# Patient Record
Sex: FEMALE | Race: OTHER | HISPANIC OR LATINO
[De-identification: names, ages, dates, MRNs, and addresses within clinical notes are randomized per-mention and may not be internally consistent; named-entity substitution may affect disease eponyms.]

---

## 2020-12-03 ENCOUNTER — RESULT REVIEW (OUTPATIENT)
Age: 30
End: 2020-12-03

## 2022-02-18 LAB — PAP SMEAR, EXTERNAL: NORMAL

## 2022-03-02 LAB
ANTIBODY SCREEN, EXTERNAL: NEGATIVE
HBSAG, EXTERNAL: NEGATIVE
HEPATITIS C AB,   EXT: NEGATIVE
HIV, EXTERNAL: NEGATIVE
HIV, EXTERNAL: NEGATIVE
RPR, EXTERNAL: NORMAL
RUBELLA, EXTERNAL: NORMAL
TYPE, ABO & RH, EXTERNAL: NORMAL
VARICELLA, EXTERNAL: 798
VARICELLA, EXTERNAL: 798

## 2022-03-22 LAB
HIV, EXTERNAL: NORMAL
VARICELLA, EXTERNAL: 798

## 2022-05-17 ENCOUNTER — NON-APPOINTMENT (OUTPATIENT)
Age: 32
End: 2022-05-17

## 2022-05-18 ENCOUNTER — TRANSCRIPTION ENCOUNTER (OUTPATIENT)
Age: 32
End: 2022-05-18

## 2022-05-21 ENCOUNTER — APPOINTMENT (OUTPATIENT)
Dept: DISASTER EMERGENCY | Facility: HOSPITAL | Age: 32
End: 2022-05-21

## 2022-05-21 ENCOUNTER — OUTPATIENT (OUTPATIENT)
Dept: OUTPATIENT SERVICES | Facility: HOSPITAL | Age: 32
LOS: 1 days | End: 2022-05-21

## 2022-05-21 VITALS
HEART RATE: 93 BPM | RESPIRATION RATE: 18 BRPM | SYSTOLIC BLOOD PRESSURE: 105 MMHG | OXYGEN SATURATION: 98 % | WEIGHT: 143.08 LBS | DIASTOLIC BLOOD PRESSURE: 69 MMHG | HEIGHT: 60 IN | TEMPERATURE: 99 F

## 2022-05-21 VITALS
RESPIRATION RATE: 17 BRPM | HEART RATE: 86 BPM | OXYGEN SATURATION: 98 % | DIASTOLIC BLOOD PRESSURE: 71 MMHG | TEMPERATURE: 98 F | SYSTOLIC BLOOD PRESSURE: 108 MMHG

## 2022-05-21 DIAGNOSIS — U07.1 COVID-19: ICD-10-CM

## 2022-05-21 RX ORDER — BEBTELOVIMAB 87.5 MG/ML
175 INJECTION, SOLUTION INTRAVENOUS ONCE
Refills: 0 | Status: COMPLETED | OUTPATIENT
Start: 2022-05-21 | End: 2022-05-21

## 2022-05-21 RX ADMIN — BEBTELOVIMAB 175 MILLIGRAM(S): 87.5 INJECTION, SOLUTION INTRAVENOUS at 11:30

## 2022-05-21 NOTE — CHART NOTE - NSCHARTNOTEFT_GEN_A_CORE
CC: Monoclonal Antibody Infusion/COVID 19 Positive  32y unvaccinated for COVID, Pregnant female, 22 weeks gestation and recent dx of COVID 19+ who presents today for elective Bebtelovimab. Patient has been screened and was deemed to be a candidate.    Symptoms/ Criteria  Date of Symptom Onset: 5/17/2022  Symptoms: nausea, body aches, fatigue, cough  Date of Positive COVID PCR: 5/19/2022  Risk Profile includes:  Pregnancy, unvaccinated status    Vaccination Status: Unvaccinated    PMHx:  Infection due to severe acute respiratory syndrome coronavirus 2 (SARS-CoV-2)  Unvaccinated COVID status        Exam/findings:  T(C): 36.9 (05-21-22 @ 12:30), Max: 37.1 (05-21-22 @ 11:17)  HR: 86 (05-21-22 @ 12:30) (86 - 94)  BP: 108/71 (05-21-22 @ 12:30) (100/66 - 108/71)  RR: 17 (05-21-22 @ 12:30) (17 - 18)  SpO2: 98% (05-21-22 @ 12:30) (97% - 98%)    PE:   Appearance: NAD	  HEENT:  NC/AT  Cardiovascular:  No edema  Respiratory: no use of accessory muscles  Gastrointestinal:  non-distended   Skin: warm and dry  Neurologic: Non-focal  Extremities: Normal range of motion    ASSESSMENT:  Pt is unvaccinated for COVID, Pregnant female, 22 weeks gestation COVID positive with mild to moderate symptoms who was referred for elective MAB (Bebtelovimab).    PLAN:  - MAB treatment explained to patient. I have reviewed the Bebtelovimab Emergency Use Authorization (EUA) and I have provided the patient or patient's caregiver with the following information:   1. FDA has authorized emergency use of Bebtelovimab to be administered for the treatment of mild to moderate COVID-19, which is not an FDA-approved biological product.   2. The patient or patient's caregiver has the option to accept or refuse administration of MAB.   3. The significant known and potential risks and benefits of Bebtelovimab and the extent to which such risks and benefits are unknown.  4. Information on available alternative treatments and risks and benefits of those alternatives.  - Patient verbalized understanding of plan and agrees to treatment. All questions answered.  - Consent for MAB obtained.   - 175mg of Bebtelovimab administered as a single intravenous injection over at least 30 seconds.   - Observe patient for one hour post medication administration and then if stable, discharge home with oupatient follow up as planned by PCP.      POST ASSESSMENT:   Patient completed MAB, and monitored x 1 hour post-infusion with no adverse reactions noted, remained hemodynamically stable.  - Patient tolerated infusion well; denies complaints of chest pain/SOB/dizziness/palpitations.   - VSS for discharge home.  - D/C instructions given/ fact sheet included.  - Patient was instructed to self-isolate and use infection control measures (e.g wear mask, isolate, social distance, avoid sharing personal items, clean and disinfect "high touch" surfaces, and frequent handwashing according to the CDC guidelines.   - The patient was informed on what symptoms to be aware of for the next couple of days, and if there are any issues to call the 24/7 clinical call center. Patient was instructed to follow up with primary care provider as needed.    DISCHARGE stable

## 2022-06-17 LAB
CHLAMYDIA, EXTERNAL: NEGATIVE
N. GONORRHEA, EXTERNAL: NEGATIVE

## 2022-07-17 ENCOUNTER — HOSPITAL ENCOUNTER (INPATIENT)
Age: 32
LOS: 2 days | Discharge: HOME | DRG: 566 | End: 2022-07-19
Attending: OBSTETRICS & GYNECOLOGY | Admitting: OBSTETRICS & GYNECOLOGY
Payer: MEDICAID

## 2022-07-17 PROBLEM — O47.03 FALSE LABOR BEFORE 37 COMPLETED WEEKS OF GESTATION IN THIRD TRIMESTER: Status: ACTIVE | Noted: 2022-07-17

## 2022-07-17 PROBLEM — Z3A.30 30 WEEKS GESTATION OF PREGNANCY: Status: ACTIVE | Noted: 2022-07-17

## 2022-07-17 PROBLEM — O60.03 PRETERM LABOR WITHOUT DELIVERY, THIRD TRIMESTER: Status: ACTIVE | Noted: 2022-07-17

## 2022-07-17 LAB
APPEARANCE UR: ABNORMAL
APPEARANCE UR: CLEAR
BACTERIA URNS QL MICRO: ABNORMAL /HPF
BACTERIA URNS QL MICRO: NEGATIVE /HPF
BASOPHILS # BLD: 0.1 K/UL (ref 0–0.1)
BASOPHILS NFR BLD: 0 % (ref 0–1)
BILIRUB UR QL: NEGATIVE
BILIRUB UR QL: NEGATIVE
COLOR UR: ABNORMAL
COLOR UR: ABNORMAL
DIFFERENTIAL METHOD BLD: ABNORMAL
EOSINOPHIL # BLD: 0.1 K/UL (ref 0–0.4)
EOSINOPHIL NFR BLD: 1 % (ref 0–7)
EPITH CASTS URNS QL MICRO: ABNORMAL /LPF
EPITH CASTS URNS QL MICRO: ABNORMAL /LPF
ERYTHROCYTE [DISTWIDTH] IN BLOOD BY AUTOMATED COUNT: 14 % (ref 11.5–14.5)
FIBRONECTIN FETAL VAG QL: NEGATIVE
GLUCOSE UR STRIP.AUTO-MCNC: NEGATIVE MG/DL
GLUCOSE UR STRIP.AUTO-MCNC: NEGATIVE MG/DL
HCT VFR BLD AUTO: 31.8 % (ref 35–47)
HGB BLD-MCNC: 10.1 G/DL (ref 11.5–16)
HGB UR QL STRIP: NEGATIVE
HGB UR QL STRIP: NEGATIVE
HYALINE CASTS URNS QL MICRO: ABNORMAL /LPF (ref 0–2)
IMM GRANULOCYTES # BLD AUTO: 0.2 K/UL (ref 0–0.04)
IMM GRANULOCYTES NFR BLD AUTO: 1 % (ref 0–0.5)
KETONES UR QL STRIP.AUTO: 40 MG/DL
KETONES UR QL STRIP.AUTO: 40 MG/DL
LEUKOCYTE ESTERASE UR QL STRIP.AUTO: ABNORMAL
LEUKOCYTE ESTERASE UR QL STRIP.AUTO: NEGATIVE
LYMPHOCYTES # BLD: 2.5 K/UL (ref 0.8–3.5)
LYMPHOCYTES NFR BLD: 15 % (ref 12–49)
MCH RBC QN AUTO: 27.7 PG (ref 26–34)
MCHC RBC AUTO-ENTMCNC: 31.8 G/DL (ref 30–36.5)
MCV RBC AUTO: 87.4 FL (ref 80–99)
MONOCYTES # BLD: 1.1 K/UL (ref 0–1)
MONOCYTES NFR BLD: 6 % (ref 5–13)
NEUTS SEG # BLD: 13.1 K/UL (ref 1.8–8)
NEUTS SEG NFR BLD: 77 % (ref 32–75)
NITRITE UR QL STRIP.AUTO: NEGATIVE
NITRITE UR QL STRIP.AUTO: NEGATIVE
NRBC # BLD: 0 K/UL (ref 0–0.01)
NRBC BLD-RTO: 0 PER 100 WBC
PH UR STRIP: 7.5 [PH] (ref 5–8)
PH UR STRIP: 7.5 [PH] (ref 5–8)
PLATELET # BLD AUTO: 399 K/UL (ref 150–400)
PMV BLD AUTO: 10.2 FL (ref 8.9–12.9)
PROT UR STRIP-MCNC: ABNORMAL MG/DL
PROT UR STRIP-MCNC: NEGATIVE MG/DL
RBC # BLD AUTO: 3.64 M/UL (ref 3.8–5.2)
RBC #/AREA URNS HPF: ABNORMAL /HPF (ref 0–5)
RBC #/AREA URNS HPF: ABNORMAL /HPF (ref 0–5)
SP GR UR REFRACTOMETRY: 1.01 (ref 1–1.03)
SP GR UR REFRACTOMETRY: 1.02 (ref 1–1.03)
UA: UC IF INDICATED,UAUC: ABNORMAL
UA: UC IF INDICATED,UAUC: ABNORMAL
UROBILINOGEN UR QL STRIP.AUTO: 0.2 EU/DL (ref 0.2–1)
UROBILINOGEN UR QL STRIP.AUTO: 1 EU/DL (ref 0.2–1)
WBC # BLD AUTO: 17 K/UL (ref 3.6–11)
WBC URNS QL MICRO: ABNORMAL /HPF (ref 0–4)
WBC URNS QL MICRO: ABNORMAL /HPF (ref 0–4)

## 2022-07-17 PROCEDURE — 74011250636 HC RX REV CODE- 250/636: Performed by: OBSTETRICS & GYNECOLOGY

## 2022-07-17 PROCEDURE — 75810000275 HC EMERGENCY DEPT VISIT NO LEVEL OF CARE

## 2022-07-17 PROCEDURE — 86921 COMPATIBILITY TEST INCUBATE: CPT

## 2022-07-17 PROCEDURE — 96361 HYDRATE IV INFUSION ADD-ON: CPT

## 2022-07-17 PROCEDURE — 86922 COMPATIBILITY TEST ANTIGLOB: CPT

## 2022-07-17 PROCEDURE — 86900 BLOOD TYPING SEROLOGIC ABO: CPT

## 2022-07-17 PROCEDURE — 85025 COMPLETE CBC W/AUTO DIFF WBC: CPT

## 2022-07-17 PROCEDURE — 86920 COMPATIBILITY TEST SPIN: CPT

## 2022-07-17 PROCEDURE — 65270000029 HC RM PRIVATE

## 2022-07-17 PROCEDURE — 36415 COLL VENOUS BLD VENIPUNCTURE: CPT

## 2022-07-17 PROCEDURE — 74011000258 HC RX REV CODE- 258: Performed by: OBSTETRICS & GYNECOLOGY

## 2022-07-17 PROCEDURE — 96374 THER/PROPH/DIAG INJ IV PUSH: CPT

## 2022-07-17 PROCEDURE — 86870 RBC ANTIBODY IDENTIFICATION: CPT

## 2022-07-17 PROCEDURE — G0378 HOSPITAL OBSERVATION PER HR: HCPCS

## 2022-07-17 PROCEDURE — 82731 ASSAY OF FETAL FIBRONECTIN: CPT

## 2022-07-17 PROCEDURE — 81001 URINALYSIS AUTO W/SCOPE: CPT

## 2022-07-17 PROCEDURE — 74011250637 HC RX REV CODE- 250/637: Performed by: OBSTETRICS & GYNECOLOGY

## 2022-07-17 PROCEDURE — 59025 FETAL NON-STRESS TEST: CPT

## 2022-07-17 PROCEDURE — 96365 THER/PROPH/DIAG IV INF INIT: CPT

## 2022-07-17 PROCEDURE — 86644 CMV ANTIBODY: CPT

## 2022-07-17 RX ORDER — ACETAMINOPHEN 325 MG/1
650 TABLET ORAL
Status: DISCONTINUED | OUTPATIENT
Start: 2022-07-17 | End: 2022-07-19 | Stop reason: HOSPADM

## 2022-07-17 RX ORDER — SODIUM CHLORIDE 0.9 % (FLUSH) 0.9 %
5-40 SYRINGE (ML) INJECTION EVERY 8 HOURS
Status: CANCELLED | OUTPATIENT
Start: 2022-07-18

## 2022-07-17 RX ORDER — SODIUM CHLORIDE, SODIUM LACTATE, POTASSIUM CHLORIDE, CALCIUM CHLORIDE 600; 310; 30; 20 MG/100ML; MG/100ML; MG/100ML; MG/100ML
125 INJECTION, SOLUTION INTRAVENOUS CONTINUOUS
Status: DISCONTINUED | OUTPATIENT
Start: 2022-07-17 | End: 2022-07-18

## 2022-07-17 RX ORDER — FENTANYL CITRATE 50 UG/ML
50 INJECTION, SOLUTION INTRAMUSCULAR; INTRAVENOUS ONCE
Status: COMPLETED | OUTPATIENT
Start: 2022-07-18 | End: 2022-07-18

## 2022-07-17 RX ORDER — INDOMETHACIN 25 MG/1
25 CAPSULE ORAL EVERY 6 HOURS
Status: DISCONTINUED | OUTPATIENT
Start: 2022-07-18 | End: 2022-07-19 | Stop reason: HOSPADM

## 2022-07-17 RX ORDER — LANOLIN ALCOHOL/MO/W.PET/CERES
325 CREAM (GRAM) TOPICAL EVERY OTHER DAY
Qty: 90 TABLET | Refills: 0 | Status: SHIPPED | OUTPATIENT
Start: 2022-07-17 | End: 2022-08-23 | Stop reason: SDUPTHER

## 2022-07-17 RX ORDER — INDOMETHACIN 25 MG/1
50 CAPSULE ORAL ONCE
Status: COMPLETED | OUTPATIENT
Start: 2022-07-17 | End: 2022-07-17

## 2022-07-17 RX ORDER — FENTANYL CITRATE 50 UG/ML
50 INJECTION, SOLUTION INTRAMUSCULAR; INTRAVENOUS ONCE
Status: COMPLETED | OUTPATIENT
Start: 2022-07-17 | End: 2022-07-17

## 2022-07-17 RX ORDER — SODIUM CHLORIDE 0.9 % (FLUSH) 0.9 %
5-40 SYRINGE (ML) INJECTION AS NEEDED
Status: CANCELLED | OUTPATIENT
Start: 2022-07-17

## 2022-07-17 RX ORDER — MAGNESIUM SULFATE HEPTAHYDRATE 40 MG/ML
2 INJECTION, SOLUTION INTRAVENOUS CONTINUOUS
Status: DISCONTINUED | OUTPATIENT
Start: 2022-07-18 | End: 2022-07-18 | Stop reason: ALTCHOICE

## 2022-07-17 RX ORDER — SODIUM CHLORIDE, SODIUM LACTATE, POTASSIUM CHLORIDE, CALCIUM CHLORIDE 600; 310; 30; 20 MG/100ML; MG/100ML; MG/100ML; MG/100ML
75 INJECTION, SOLUTION INTRAVENOUS CONTINUOUS
Status: DISCONTINUED | OUTPATIENT
Start: 2022-07-18 | End: 2022-07-19 | Stop reason: HOSPADM

## 2022-07-17 RX ORDER — MAGNESIUM SULFATE HEPTAHYDRATE 40 MG/ML
4 INJECTION, SOLUTION INTRAVENOUS ONCE
Status: COMPLETED | OUTPATIENT
Start: 2022-07-17 | End: 2022-07-18

## 2022-07-17 RX ORDER — CALCIUM CARBONATE 200(500)MG
400 TABLET,CHEWABLE ORAL
Status: DISCONTINUED | OUTPATIENT
Start: 2022-07-17 | End: 2022-07-19 | Stop reason: HOSPADM

## 2022-07-17 RX ORDER — SODIUM CHLORIDE 0.9 % (FLUSH) 0.9 %
5-40 SYRINGE (ML) INJECTION EVERY 8 HOURS
Status: DISCONTINUED | OUTPATIENT
Start: 2022-07-18 | End: 2022-07-19 | Stop reason: HOSPADM

## 2022-07-17 RX ORDER — CALCIUM GLUCONATE 20 MG/ML
1 INJECTION, SOLUTION INTRAVENOUS AS NEEDED
Status: DISCONTINUED | OUTPATIENT
Start: 2022-07-17 | End: 2022-07-19 | Stop reason: HOSPADM

## 2022-07-17 RX ORDER — OXYTOCIN/RINGER'S LACTATE 30/500 ML
87.3 PLASTIC BAG, INJECTION (ML) INTRAVENOUS AS NEEDED
Status: CANCELLED | OUTPATIENT
Start: 2022-07-17

## 2022-07-17 RX ORDER — SODIUM CHLORIDE 0.9 % (FLUSH) 0.9 %
5-40 SYRINGE (ML) INJECTION AS NEEDED
Status: DISCONTINUED | OUTPATIENT
Start: 2022-07-17 | End: 2022-07-19 | Stop reason: HOSPADM

## 2022-07-17 RX ORDER — BETAMETHASONE SODIUM PHOSPHATE AND BETAMETHASONE ACETATE 3; 3 MG/ML; MG/ML
12 INJECTION, SUSPENSION INTRA-ARTICULAR; INTRALESIONAL; INTRAMUSCULAR; SOFT TISSUE EVERY 24 HOURS
Status: COMPLETED | OUTPATIENT
Start: 2022-07-18 | End: 2022-07-19

## 2022-07-17 RX ORDER — OXYTOCIN/RINGER'S LACTATE 30/500 ML
10 PLASTIC BAG, INJECTION (ML) INTRAVENOUS AS NEEDED
Status: CANCELLED | OUTPATIENT
Start: 2022-07-17

## 2022-07-17 RX ORDER — MAG HYDROX/ALUMINUM HYD/SIMETH 200-200-20
30 SUSPENSION, ORAL (FINAL DOSE FORM) ORAL
Status: DISCONTINUED | OUTPATIENT
Start: 2022-07-17 | End: 2022-07-19 | Stop reason: HOSPADM

## 2022-07-17 RX ORDER — ONDANSETRON 2 MG/ML
4 INJECTION INTRAMUSCULAR; INTRAVENOUS
Status: DISCONTINUED | OUTPATIENT
Start: 2022-07-17 | End: 2022-07-19 | Stop reason: HOSPADM

## 2022-07-17 RX ORDER — MINERAL OIL
120 OIL (ML) ORAL ONCE
Status: ACTIVE | OUTPATIENT
Start: 2022-07-18 | End: 2022-07-18

## 2022-07-17 RX ADMIN — SODIUM CHLORIDE, POTASSIUM CHLORIDE, SODIUM LACTATE AND CALCIUM CHLORIDE 1000 ML: 600; 310; 30; 20 INJECTION, SOLUTION INTRAVENOUS at 14:25

## 2022-07-17 RX ADMIN — INDOMETHACIN 50 MG: 25 CAPSULE ORAL at 23:58

## 2022-07-17 RX ADMIN — FENTANYL CITRATE 50 MCG: 50 INJECTION, SOLUTION INTRAMUSCULAR; INTRAVENOUS at 19:59

## 2022-07-17 RX ADMIN — SODIUM CHLORIDE, POTASSIUM CHLORIDE, SODIUM LACTATE AND CALCIUM CHLORIDE 125 ML/HR: 600; 310; 30; 20 INJECTION, SOLUTION INTRAVENOUS at 23:57

## 2022-07-17 RX ADMIN — SODIUM CHLORIDE 5 MILLION UNITS: 900 INJECTION INTRAVENOUS at 23:59

## 2022-07-17 NOTE — PROGRESS NOTES
Transvaginal ultrasound report  Indication: Threatened  labor  Transvaginal ultrasound to assess cervical length. No placenta previa. Fetus in cephalic presentation  WE Scanned for over ten minutes. The cervix shortened, with a contraction, to 27 mm. Cervical length.   2022  Darlena Bloch, MD

## 2022-07-17 NOTE — PROGRESS NOTES
: Bedside and Verbal shift change report given to JATIN Bone RN (oncoming nurse) by VANESSA Ellison RN (offgoing nurse). Report included the following information SBAR, Intake/Output, MAR, Recent Results and Quality Measures. : This RN at bedside for assessment. Abdomen palpated, soft and nontender. Patient reports positive FM, denies LOF or vaginal bleeding. Patient declines pain medication at this time. Patient to alert nursing for any needs. FOB at bedside. : This RN assisting patient to bathroom. She states her pain is well controlled. : SVE: closed by Merlin Hippo MD. Plan to treat for  labor. 100: This RN and Kerry RN at bedside to initiate Magnesium infusion. Bedside commode brought into patient's room. 0530: Patient states her pain is gone and she is no longer nauseous. Patient plans to continue resting. FOB sleeping at bedside. 7320: Bedside and Verbal shift change report given to Jayesh Saba RN (oncoming nurse) by Lavonne Delacruz RN (offgoing nurse). Report included the following information SBAR, Intake/Output, MAR, Recent Results and Quality Measures.

## 2022-07-17 NOTE — PROGRESS NOTES
1350 Pt arrives to L&D for the c/o ctx that started yesterday morning and worsened throughout the day. Pt began to time the ctx at 9pm and stated they were every 5 10min. Pt stated they went away at bedtime and returned this morning at approx 0800. Pt denies any lof or vaginal bleeding. Pt recently moved here from [place], [place] and has not found a provider yet. Pt has received PNC since the first trimester in [place], [place]. Pt stated she has two uterine fibroids on lower left side. Pt oriented to room. Call bell within reach. [address] Dr Liya Rajput at bedside to evaluate pt.     46 Dr Liya Rajput at bedside to collect ffn and perform vaginal ultrasound. Cervix closed per Dr Liya Rajput. 1530 UA results reviewed with Dr Liya Rajput. Orders received for UA with reflex culture to repeated due to last specimen not being a clean catch. [address] Dr Liya Rajput aware pt c/o lower abdominal pain 6/10. Orders received for Fentanyl 50mcg ivp. 1815 Pt declines pain med at this time. Pt verbalized her pain is better since she changed her position. 1820 Pt eating dinner. Difficulty tracing fhr while pt sitting up in high santos position.     1905 Shift report given to Sukhjinder Muller RN

## 2022-07-17 NOTE — H&P
[address] [facility] [address] [place] [address]   Office [phone], Fax [phone]      History & Physical    Name: Niki Doyle MRN: [id number]  SSN: [id number]    YOB: 1990  Age: 33 y.o. Sex: female      Subjective:     Reason for Admission:  Pregnancy and r/o  labor    History of Present Illness: Ms. Coreen [place] is a 33 y.o.  female with an estimated gestational age of 32w2d with Estimated Date of Delivery: 22. Patient complains of severe contractions for 1 day. Contractions were 5-10 min apart last night, but did resolved during night and the restarted again at 8am this AM. Pt initially thought contractions are Trevor-Nimesh, but decided to come in due to severity and persistence of contractions. Pregnancy has been complicated by RH negative status and move from [place]. Patient denies abdominal pain  , chest pain, fever, headache , nausea and vomiting, right upper quadrant pain  , vaginal bleeding , vaginal leaking of fluid  and visual disturbances. OB History    Para Term  AB Living   2             SAB IAB Ectopic Molar Multiple Live Births                    # Outcome Date GA Lbr Will/2nd Weight Sex Delivery Anes PTL Lv   2 Current            1               No past medical history on file. No past surgical history on file. Social History     Occupational History    Not on file   Tobacco Use    Smoking status: Former Smoker    Smokeless tobacco: Not on file   Substance and Sexual Activity    Alcohol use: Not Currently    Drug use: Never    Sexual activity: Not on file      No family history on file. No Known Allergies  Prior to Admission medications    Medication Sig Start Date End Date Taking? Authorizing Provider   PNV XF.87/GERMAN fum/folic ac (PRENATAL PO) Take  by mouth. Yes Provider, Historical        Review of Systems:  A comprehensive review of systems was negative except for that written in the History of Present Illness. Objective:     Vitals:    Vitals:    22 1356   BP: (!) 106/58   Temp: 98.4 °F (36.9 °C)      Temp (24hrs), Av.4 °F (36.9 °C), Min:98.4 °F (36.9 °C), Max:98.4 °F (36.9 °C)    BP  Min: 106/58  Max: 106/58     Physical Exam:  Patient without distress. Heart: Regular rate and rhythm, S1S2 present or without murmur or extra heart sounds  Lung: clear to auscultation throughout lung fields, no wheezes, no rales, no rhonchi and normal respiratory effort  Abdomen: soft, nontender  Fundus: soft and non tender  Perineum: blood absent, amniotic fluid absent  Cervical Exam: Cervix closed on US  Lower Extremities:  - Edema No     Membranes:  Intact  Fetal Heart Rate:  Reactive  Baseline: 145 per minute  Variability: moderate  Accelerations: yes  Decelerations: none  Uterine contractions: irregular, every 3-10 minutes       Lab/Data Review:  Recent Results (from the past 24 hour(s))   CBC WITH AUTOMATED DIFF    Collection Time: 22  2:35 PM   Result Value Ref Range    WBC 17.0 (H) 3.6 - 11.0 K/uL    RBC 3.64 (L) 3.80 - 5.20 M/uL    HGB 10.1 (L) 11.5 - 16.0 g/dL    HCT 31.8 (L) 35.0 - 47.0 %    MCV 87.4 80.0 - 99.0 FL    MCH 27.7 26.0 - 34.0 PG    MCHC 31.8 30.0 - 36.5 g/dL    RDW 14.0 11.5 - 14.5 %    PLATELET 544 050 - 017 K/uL    MPV 10.2 8.9 - 12.9 FL    NRBC 0.0 0  WBC    ABSOLUTE NRBC 0.00 0.00 - 0.01 K/uL    NEUTROPHILS 77 (H) 32 - 75 %    LYMPHOCYTES 15 12 - 49 %    MONOCYTES 6 5 - 13 %    EOSINOPHILS 1 0 - 7 %    BASOPHILS 0 0 - 1 %    IMMATURE GRANULOCYTES 1 (H) 0.0 - 0.5 %    ABS. NEUTROPHILS 13.1 (H) 1.8 - 8.0 K/UL    ABS. LYMPHOCYTES 2.5 0.8 - 3.5 K/UL    ABS. MONOCYTES 1.1 (H) 0.0 - 1.0 K/UL    ABS. EOSINOPHILS 0.1 0.0 - 0.4 K/UL    ABS. BASOPHILS 0.1 0.0 - 0.1 K/UL    ABS. IMM.  GRANS. 0.2 (H) 0.00 - 0.04 K/UL    DF AUTOMATED     URINALYSIS W/ REFLEX CULTURE    Collection Time: 22  2:35 PM    Specimen: Urine   Result Value Ref Range    Color YELLOW/STRAW      Appearance CLOUDY (A) CLEAR      Specific gravity 1.019 1.003 - 1.030      pH (UA) 7.5 5.0 - 8.0      Protein TRACE (A) NEG mg/dL    Glucose Negative NEG mg/dL    Ketone 40 (A) NEG mg/dL    Bilirubin Negative NEG      Blood Negative NEG      Urobilinogen 1.0 0.2 - 1.0 EU/dL    Nitrites Negative NEG      Leukocyte Esterase SMALL (A) NEG      WBC PENDING /hpf    RBC PENDING /hpf    Epithelial cells PENDING /lpf    Bacteria PENDING /hpf    UA:UC IF INDICATED PENDING        Assessment and Plan:     Ms. Flavio Bettencourt is a 33 y.o.  female with an estimated gestational age of 32w2d who is observed for r/o  labor. R/o  labor: Cervix 2.7cm during contraction.   - F/u FFN. If elevated will give betamethasone.  If negative, will observe and if no signs of labor discharge  - IVF  - Continue fetal and contraction monitoring    SIUP at 30w3d: Recently moved from [place] and does not have records  - Request records  - Will offer follow up at [facility]    Anemia: POA Hgb 10.1  - Start iron supplementation every other day    Rh neg: s/p Rhogam per pt    Patient seen and discussed with Dr. Nisha Sarabia MD  Family Medicine Resident

## 2022-07-18 PROCEDURE — 36415 COLL VENOUS BLD VENIPUNCTURE: CPT

## 2022-07-18 PROCEDURE — 74011000250 HC RX REV CODE- 250: Performed by: OBSTETRICS & GYNECOLOGY

## 2022-07-18 PROCEDURE — 65270000029 HC RM PRIVATE

## 2022-07-18 PROCEDURE — 74011250636 HC RX REV CODE- 250/636: Performed by: OBSTETRICS & GYNECOLOGY

## 2022-07-18 PROCEDURE — 74011250637 HC RX REV CODE- 250/637: Performed by: OBSTETRICS & GYNECOLOGY

## 2022-07-18 PROCEDURE — 74011000258 HC RX REV CODE- 258: Performed by: OBSTETRICS & GYNECOLOGY

## 2022-07-18 RX ORDER — DOCUSATE SODIUM 100 MG/1
100 CAPSULE, LIQUID FILLED ORAL 2 TIMES DAILY
Status: DISCONTINUED | OUTPATIENT
Start: 2022-07-18 | End: 2022-07-18

## 2022-07-18 RX ADMIN — MAGNESIUM SULFATE HEPTAHYDRATE 4 G: 40 INJECTION, SOLUTION INTRAVENOUS at 01:06

## 2022-07-18 RX ADMIN — ONDANSETRON 4 MG: 2 INJECTION INTRAMUSCULAR; INTRAVENOUS at 01:18

## 2022-07-18 RX ADMIN — MAGNESIUM SULFATE HEPTAHYDRATE 2 G/HR: 40 INJECTION, SOLUTION INTRAVENOUS at 01:27

## 2022-07-18 RX ADMIN — SODIUM CHLORIDE 2.5 MILLION UNITS: 9 INJECTION, SOLUTION INTRAVENOUS at 12:27

## 2022-07-18 RX ADMIN — SODIUM CHLORIDE 2.5 MILLION UNITS: 9 INJECTION, SOLUTION INTRAVENOUS at 09:07

## 2022-07-18 RX ADMIN — BETAMETHASONE SODIUM PHOSPHATE AND BETAMETHASONE ACETATE 12 MG: 3; 3 INJECTION, SUSPENSION INTRA-ARTICULAR; INTRALESIONAL; INTRAMUSCULAR at 01:05

## 2022-07-18 RX ADMIN — SODIUM CHLORIDE, PRESERVATIVE FREE 10 ML: 5 INJECTION INTRAVENOUS at 22:00

## 2022-07-18 RX ADMIN — INDOMETHACIN 25 MG: 25 CAPSULE ORAL at 22:05

## 2022-07-18 RX ADMIN — SODIUM CHLORIDE 2.5 MILLION UNITS: 9 INJECTION, SOLUTION INTRAVENOUS at 16:01

## 2022-07-18 RX ADMIN — INDOMETHACIN 25 MG: 25 CAPSULE ORAL at 16:01

## 2022-07-18 RX ADMIN — SODIUM CHLORIDE, POTASSIUM CHLORIDE, SODIUM LACTATE AND CALCIUM CHLORIDE 125 ML/HR: 600; 310; 30; 20 INJECTION, SOLUTION INTRAVENOUS at 01:09

## 2022-07-18 RX ADMIN — SODIUM CHLORIDE 2.5 MILLION UNITS: 9 INJECTION, SOLUTION INTRAVENOUS at 04:03

## 2022-07-18 RX ADMIN — SODIUM CHLORIDE, PRESERVATIVE FREE 10 ML: 5 INJECTION INTRAVENOUS at 00:05

## 2022-07-18 RX ADMIN — FENTANYL CITRATE 50 MCG: 50 INJECTION, SOLUTION INTRAMUSCULAR; INTRAVENOUS at 00:02

## 2022-07-18 RX ADMIN — INDOMETHACIN 25 MG: 25 CAPSULE ORAL at 09:31

## 2022-07-18 RX ADMIN — INDOMETHACIN 25 MG: 25 CAPSULE ORAL at 05:48

## 2022-07-18 NOTE — PROGRESS NOTES
Labor Progress Note  Patient seen, fetal heart rate and contraction pattern evaluated, patient examined. Patient is still having painful contractions. Visit Vitals  BP (!) 119/56 (BP 1 Location: Right upper arm, BP Patient Position: Semi fowlers)   Pulse 91   Temp 98.4 °F (36.9 °C)   Resp 16   SpO2 100%       Physical Exam:    33 y.o.  in no acute distress. Cervical Exam:   Presentation Vertex  Dilation 0 cms   Effacement 50 %   Station -3  Membranes: Intact  Uterine Activity:   Frequency: Every 2 - 3 minutes   Duration: 75 seconds   Intensity: Moderate  Fetal Heart Rate:    Baseline: 155 bpm   Variability: Moderate   Accelerations: Present (15 x 15 bpm)   Decelerations: None  Category: 1        Recent Results (from the past 12 hour(s))   FETAL FIBRONECTIN    Collection Time: 22  2:35 PM   Result Value Ref Range    Fetal fibronectin Negative NEG     CBC WITH AUTOMATED DIFF    Collection Time: 22  2:35 PM   Result Value Ref Range    WBC 17.0 (H) 3.6 - 11.0 K/uL    RBC 3.64 (L) 3.80 - 5.20 M/uL    HGB 10.1 (L) 11.5 - 16.0 g/dL    HCT 31.8 (L) 35.0 - 47.0 %    MCV 87.4 80.0 - 99.0 FL    MCH 27.7 26.0 - 34.0 PG    MCHC 31.8 30.0 - 36.5 g/dL    RDW 14.0 11.5 - 14.5 %    PLATELET 806 776 - 689 K/uL    MPV 10.2 8.9 - 12.9 FL    NRBC 0.0 0  WBC    ABSOLUTE NRBC 0.00 0.00 - 0.01 K/uL    NEUTROPHILS 77 (H) 32 - 75 %    LYMPHOCYTES 15 12 - 49 %    MONOCYTES 6 5 - 13 %    EOSINOPHILS 1 0 - 7 %    BASOPHILS 0 0 - 1 %    IMMATURE GRANULOCYTES 1 (H) 0.0 - 0.5 %    ABS. NEUTROPHILS 13.1 (H) 1.8 - 8.0 K/UL    ABS. LYMPHOCYTES 2.5 0.8 - 3.5 K/UL    ABS. MONOCYTES 1.1 (H) 0.0 - 1.0 K/UL    ABS. EOSINOPHILS 0.1 0.0 - 0.4 K/UL    ABS. BASOPHILS 0.1 0.0 - 0.1 K/UL    ABS. IMM.  GRANS. 0.2 (H) 0.00 - 0.04 K/UL    DF AUTOMATED     URINALYSIS W/ REFLEX CULTURE    Collection Time: 22  2:35 PM    Specimen: Urine   Result Value Ref Range    Color YELLOW/STRAW      Appearance CLOUDY (A) CLEAR      Specific gravity 1.019 1.003 - 1.030      pH (UA) 7.5 5.0 - 8.0      Protein TRACE (A) NEG mg/dL    Glucose Negative NEG mg/dL    Ketone 40 (A) NEG mg/dL    Bilirubin Negative NEG      Blood Negative NEG      Urobilinogen 1.0 0.2 - 1.0 EU/dL    Nitrites Negative NEG      Leukocyte Esterase SMALL (A) NEG      WBC 5-10 0 - 4 /hpf    RBC 0-5 0 - 5 /hpf    Epithelial cells MANY (A) FEW /lpf    Bacteria 3+ (A) NEG /hpf    UA:UC IF INDICATED CULTURE NOT INDICATED BY UA RESULT CNI     URINALYSIS W/ REFLEX CULTURE    Collection Time: 22  5:43 PM    Specimen: Urine   Result Value Ref Range    Color YELLOW/STRAW      Appearance CLEAR CLEAR      Specific gravity 1.010 1.003 - 1.030      pH (UA) 7.5 5.0 - 8.0      Protein Negative NEG mg/dL    Glucose Negative NEG mg/dL    Ketone 40 (A) NEG mg/dL    Bilirubin Negative NEG      Blood Negative NEG      Urobilinogen 0.2 0.2 - 1.0 EU/dL    Nitrites Negative NEG      Leukocyte Esterase Negative NEG      UA:UC IF INDICATED CULTURE NOT INDICATED BY UA RESULT CNI      WBC 0-4 0 - 4 /hpf    RBC 0-5 0 - 5 /hpf    Epithelial cells FEW FEW /lpf    Bacteria Negative NEG /hpf    Hyaline cast 0-2 0 - 2 /lpf     Assessment/Plan:  Active Hospital Problems    Diagnosis Date Noted     labor without delivery, third trimester 2022    30 weeks gestation of pregnancy 2022     Still salma painfully, every 2 -3 minutes. Will treat for  labor with betamethasone, indomethacin, penicillin, and magnesium.    Stephen Briceno MD  2022

## 2022-07-18 NOTE — PROGRESS NOTES
[facility]      A-/Ab screen negative. Hbfrc wnl. HIV/RPR NR. HCV negative. HBsAg neg. GC/CT negative. CMP ok. A1c 5.5%. Pap NILM. Rubella/VZV immune. Rubeola immune/Parvovirus IgM/IgG negative. CMV IgM negative, IgG positive. Toxo IgM/IgG negative. SMA carrier screen negative. Fragile X PCR negative. CF carrier screen negative. AFP negative for spina bifida. Timothy Longoria done 4/14/22 w fetal Hb of 0.      Zoë Salazar MD  [facility] Family Medicine Resident

## 2022-07-18 NOTE — PROGRESS NOTES
0700 Bedside and Verbal shift change report given to [address] (oncoming nurse) by Esequiel Mixon RN (offgoing nurse). Report included the following information SBAR, Kardex, Procedure Summary, Intake/Output, MAR, Accordion, Recent Results and Med Rec Status. 0700 This RN at bedside for assessment. 0930 Dr. Farrah Santo at bedside, MD REA to stop Magnesium infusion at this time. Will continue to monitor. 1045 Dr. Farrah Santo order prenetal labs, but patients want her doctor in [place] to send her records. [address] requested from [address] via fax, received confirmation sheet. 1900 Bedside and Verbal shift change report given to Ray FLORIAN (oncoming nurse) by Adria Spatz RN (offgoing nurse). Report included the following information SBAR, Kardex, Procedure Summary, Intake/Output, MAR, Accordion, Recent Results and Med Rec Status.

## 2022-07-18 NOTE — PROGRESS NOTES
Antepartum Progress Note    Name: Amalia Mart MRN: [id number]  SSN: [id number]    YOB: 1990  Age: 33 y.o. Sex: female      Subjective:      LOS: 1 day    Estimated Date of Delivery: 22   Gestational Age Today: 30w3d     Patient admitted for r/o  labor. States contractions appear to have spaced out a bit and are much less painful. No headaches, visual changes, chest pain, palpitations, dyspnea, abdominal pain, urinary symptoms. Objective:     Vitals:  Blood pressure 100/62, pulse 74, temperature 97.6 °F (36.4 °C), resp. rate 16, SpO2 96 %. Temp (24hrs), Av.9 °F (36.6 °C), Min:97.5 °F (36.4 °C), Max:98.4 °F (21.9 °C)    Systolic (63SVH), XYZ:099 , Min:99 , VOJ:728      Diastolic (56VKN), CSD:80, Min:54, Max:72       Intake and Output:     Date 22 0700 - 22 0659   Shift 5829-9545 8669-1907 7832-9182 24 Hour Total   INTAKE   I.V. 525   525   Shift Total 525   525   OUTPUT   Urine 400   400   Shift Total 400   400   Weight (kg)           Physical Exam:  Patient without distress.   Heart: Regular rate and rhythm, S1S2 present or without murmur or extra heart sounds  Lung: clear to auscultation throughout lung fields, no wheezes, no rales, no rhonchi and normal respiratory effort  Back: costovertebral angle tenderness absent  Abdomen: soft, nontender  Lower Extremities:  - Edema No       Membranes:  Intact    Uterine Activity:  Frequency: Every 15 minutes     Fetal Heart Rate:  Baseline: 120 per minute  Variability: moderate  Accelerations: no  Decelerations: none        Labs:   Recent Results (from the past 24 hour(s))   FETAL FIBRONECTIN    Collection Time: 22  2:35 PM   Result Value Ref Range    Fetal fibronectin Negative NEG     CBC WITH AUTOMATED DIFF    Collection Time: 22  2:35 PM   Result Value Ref Range    WBC 17.0 (H) 3.6 - 11.0 K/uL    RBC 3.64 (L) 3.80 - 5.20 M/uL    HGB 10.1 (L) 11.5 - 16.0 g/dL    HCT 31.8 (L) 35.0 - 47.0 %    MCV 87.4 80.0 - 99.0 FL    MCH 27.7 26.0 - 34.0 PG    MCHC 31.8 30.0 - 36.5 g/dL    RDW 14.0 11.5 - 14.5 %    PLATELET 771 977 - 455 K/uL    MPV 10.2 8.9 - 12.9 FL    NRBC 0.0 0  WBC    ABSOLUTE NRBC 0.00 0.00 - 0.01 K/uL    NEUTROPHILS 77 (H) 32 - 75 %    LYMPHOCYTES 15 12 - 49 %    MONOCYTES 6 5 - 13 %    EOSINOPHILS 1 0 - 7 %    BASOPHILS 0 0 - 1 %    IMMATURE GRANULOCYTES 1 (H) 0.0 - 0.5 %    ABS. NEUTROPHILS 13.1 (H) 1.8 - 8.0 K/UL    ABS. LYMPHOCYTES 2.5 0.8 - 3.5 K/UL    ABS. MONOCYTES 1.1 (H) 0.0 - 1.0 K/UL    ABS. EOSINOPHILS 0.1 0.0 - 0.4 K/UL    ABS. BASOPHILS 0.1 0.0 - 0.1 K/UL    ABS. IMM.  GRANS. 0.2 (H) 0.00 - 0.04 K/UL    DF AUTOMATED     URINALYSIS W/ REFLEX CULTURE    Collection Time: 07/17/22  2:35 PM    Specimen: Urine   Result Value Ref Range    Color YELLOW/STRAW      Appearance CLOUDY (A) CLEAR      Specific gravity 1.019 1.003 - 1.030      pH (UA) 7.5 5.0 - 8.0      Protein TRACE (A) NEG mg/dL    Glucose Negative NEG mg/dL    Ketone 40 (A) NEG mg/dL    Bilirubin Negative NEG      Blood Negative NEG      Urobilinogen 1.0 0.2 - 1.0 EU/dL    Nitrites Negative NEG      Leukocyte Esterase SMALL (A) NEG      WBC 5-10 0 - 4 /hpf    RBC 0-5 0 - 5 /hpf    Epithelial cells MANY (A) FEW /lpf    Bacteria 3+ (A) NEG /hpf    UA:UC IF INDICATED CULTURE NOT INDICATED BY UA RESULT CNI     TYPE & SCREEN    Collection Time: 07/17/22  2:35 PM   Result Value Ref Range    Crossmatch Expiration 07/20/2022,2359     ABO/Rh(D) A NEGATIVE     Antibody screen POS     Antibody ID PENDING     Unit number [id number]     Blood component type RC LR     Unit division 00     Status of unit REL FROM [facility]     Crossmatch result Incompatible     Unit number [id number]     Blood component type RC LR,1     Unit division 00     Status of unit ALLOCATED     Crossmatch result Compatible     Unit number [id number]     Blood component type RC LR     Unit division 00     Status of unit ALLOCATED     Crossmatch result Compatible URINALYSIS W/ REFLEX CULTURE    Collection Time: 22  5:43 PM    Specimen: Urine   Result Value Ref Range    Color YELLOW/STRAW      Appearance CLEAR CLEAR      Specific gravity 1.010 1.003 - 1.030      pH (UA) 7.5 5.0 - 8.0      Protein Negative NEG mg/dL    Glucose Negative NEG mg/dL    Ketone 40 (A) NEG mg/dL    Bilirubin Negative NEG      Blood Negative NEG      Urobilinogen 0.2 0.2 - 1.0 EU/dL    Nitrites Negative NEG      Leukocyte Esterase Negative NEG      UA:UC IF INDICATED CULTURE NOT INDICATED BY UA RESULT CNI      WBC 0-4 0 - 4 /hpf    RBC 0-5 0 - 5 /hpf    Epithelial cells FEW FEW /lpf    Bacteria Negative NEG /hpf    Hyaline cast 0-2 0 - 2 /lpf       Assessment and Plan:      Bere Jang is a 33 y.o.  F at 30w4d admitted for rule out  labor. Concern for  labor: Pt presenting at 30w4d with frequent, painful contractions, concerning for  labor. Positive fFN, therefore cannot rule out  labor in next couple of weeks. Started on tocolytics, PCN, and betamethasone.  - Continue Indomethacin x 48 hours  - Stop Magnesium gtt after 6 hours  - Continue PCN  - S/p one dose of betamethasone, next dose due at 0100 on     SIUP at 30w4d: A-/Ab screen positive. Remainder of PNL unknown, unable to obtain prenatal records from previous OB  - Obtain prenatal labs  - Continuous fetal monitoring  - Pt to establish care with OB/GYN, plans to do so soon. Will update team with name of OB that she is planning on establishing care with. Rh negative status: S/p Rhogam per pt. Anemia: CBC 10.1 on admission, will need iron at point of d/c.      Patient discussed with Dr. Ora Cao By: Jb Thompson MD    Family Medicine Resident

## 2022-07-19 VITALS
SYSTOLIC BLOOD PRESSURE: 122 MMHG | DIASTOLIC BLOOD PRESSURE: 56 MMHG | RESPIRATION RATE: 16 BRPM | OXYGEN SATURATION: 98 % | TEMPERATURE: 98.7 F | HEART RATE: 78 BPM

## 2022-07-19 LAB
ABO + RH BLD: NORMAL
BLD PROD TYP BPU: NORMAL
BLOOD GROUP ANTIBODIES SERPL: NORMAL
BLOOD GROUP ANTIBODIES SERPL: NORMAL
BPU ID: NORMAL
CROSSMATCH RESULT,%XM: NORMAL
SPECIMEN EXP DATE BLD: NORMAL
STATUS OF UNIT,%ST: NORMAL
UNIT DIVISION, %UDIV: 0

## 2022-07-19 PROCEDURE — 74011250637 HC RX REV CODE- 250/637: Performed by: OBSTETRICS & GYNECOLOGY

## 2022-07-19 PROCEDURE — 74011250636 HC RX REV CODE- 250/636: Performed by: OBSTETRICS & GYNECOLOGY

## 2022-07-19 RX ORDER — INDOMETHACIN 25 MG/1
25 CAPSULE ORAL ONCE
Qty: 1 CAPSULE | Refills: 0 | OUTPATIENT
Start: 2022-07-19 | End: 2022-07-19

## 2022-07-19 RX ORDER — INDOMETHACIN 25 MG/1
25 CAPSULE ORAL ONCE
Qty: 1 CAPSULE | Refills: 0 | Status: SHIPPED | OUTPATIENT
Start: 2022-07-19 | End: 2022-07-19

## 2022-07-19 RX ADMIN — INDOMETHACIN 25 MG: 25 CAPSULE ORAL at 10:40

## 2022-07-19 RX ADMIN — BETAMETHASONE SODIUM PHOSPHATE AND BETAMETHASONE ACETATE 12 MG: 3; 3 INJECTION, SUSPENSION INTRA-ARTICULAR; INTRALESIONAL; INTRAMUSCULAR at 01:07

## 2022-07-19 RX ADMIN — INDOMETHACIN 25 MG: 25 CAPSULE ORAL at 04:01

## 2022-07-19 NOTE — PROGRESS NOTES
2022   1:55 PM    CM met with MOB to complete initial assessment and begin discharge planning. MOB verified and confirmed demographics. BRITT lives with BELINDA Waldrop- [phone],  at the address on file. BRITT has recently relocated from [place] and plans to remain in [place]. BRITT is not employed and plans to be home with infant. BRITT reports she has good family support, and feels like she has the support she needs when she returns home. BRITT plans to breast feed baby and will be ordering a pump. [facility] will provide follow up care for infant. BRITT is in the process of obtaining supplies for infant. MOB concerned that she currently still has [place] Medicaid and will lapse end of this month. CM discussed [place] and process to apply. CM provided MOB with contact information for Medicaid program along with a copy of application. Financial assistance application also provided to MOB. CM also provided MOB with [facility] information. BRITT is , admitted for r/o labor. Care Management Interventions  PCP Verified by CM: Yes ([facility])  Mode of Transport at Discharge:  Other (see comment)  Transition of Care Consult (CM Consult): Discharge Planning  Support Systems: Other Family Member(s),Spouse/Significant Other  Confirm Follow Up Transport: Family  Discharge Location  Patient Expects to be Discharged to[de-identified] Home with family assistance  Beto Borrego

## 2022-07-19 NOTE — DISCHARGE SUMMARY
Obstetrical Discharge Summary     Name: Fabio Bruno MRN: [id number]  SSN: [id number]    YOB: 1990  Age: 33 y.o. Sex: female      Admit Date: 2022    Discharge Date: 2022     Admitting Physician: Kenn Merrill MD     Attending Physician: Honey Martinez MD     Admission Diagnoses: False labor before 37 completed weeks of gestation in third trimester [O47.03]   labor without delivery, third trimester [O60.03]    Discharge Diagnoses: This patient has no babies on file. Additional Diagnoses:   Hospital Problems  Never Reviewed          Codes Class Noted POA     labor without delivery, third trimester ICD-10-CM: O60.03  ICD-9-CM: 644.03  2022 Yes        30 weeks gestation of pregnancy ICD-10-CM: Z3A.30  ICD-9-CM: V22.2  2022 Unknown             Lab Results   Component Value Date/Time    Rubella, External IMMUNE 2022 12:00 AM       Immunization(s): There is no immunization history for the selected administration types on file for this patient. Hospital Course:   Patient is a 33 y.o.   s/p  labor r/o at 30 weeks 5 days. Presented for severe  contractions on . FFN negative, but considering her contractions, initiated fetal lung/neuro protection as well as tocolytic therapy. S/p magnesium, PCN, betamethasone x2. Currently finishing 48hr course of indomethacin. Last dose will be taken today outpatient at approx 1600. Denies any contractions, pelvic pressure, or fluid leakage this morning. Last contraction felt yesterday approx 1600. Stable and ready for discharge.  Depression Scale           Follow up test at discharge: GTT   Condition at Discharge:  stable  Disposition: Discharge to Home    Physical exam:  Visit Vitals  /61 (BP 1 Location: Left upper arm, BP Patient Position: Semi fowlers)   Pulse 75   Temp 98 °F (36.7 °C)   Resp 16   SpO2 99%       Exam:  Patient without distress.                CTAB, no w/r/r/c               RRR, + S1 and S2, no m/r/g               Abdomen soft, fundus firm at level of umbilicus, non tender               Lower extremities are negative for swelling, cords or tenderness. Patient Instructions:   Current Discharge Medication List      START taking these medications    Details   ferrous sulfate (Iron) 325 mg (65 mg iron) tablet Take 1 Tablet by mouth every other day. Qty: 90 Tablet, Refills: 0         CONTINUE these medications which have NOT CHANGED    Details   PNV VR.74/LIWBNCS fum/folic ac (PRENATAL PO) Take  by mouth. Reference my discharge instructions.     Follow-up Information     Follow up With Specialties Details Why Contact Info    Ruben Bourne MD Family Medicine On 7/22/2022 1pm Transfer OB [address]  [phone]              Signed By:  Mary Ann Grant DO    Family Medicine Resident

## 2022-07-19 NOTE — PROGRESS NOTES
Antepartum Progress Note    Name: Dolores Salazar MRN: [id number]  SSN: [id number]    YOB: 1990  Age: 33 y.o. Sex: female      Subjective:      LOS: 2 days    Estimated Date of Delivery: 22   Gestational Age Today: 30w7d    Patient admitted for r/o  labor. She denies feeling painful contractions for approx. 16 hours. Last felt yesterday early afternoon. No headaches, visual changes, chest pain, palpitations, dyspnea, abdominal pain, urinary symptoms. Overall feels comfortable and is seeking assistance getting insurance and a follow up OB appointment. Objective:     Vitals:  Blood pressure 103/61, pulse 75, temperature 98 °F (36.7 °C), resp. rate 16, SpO2 99 %. Temp (24hrs), Av.9 °F (36.6 °C), Min:97.6 °F (36.4 °C), Max:98.4 °F (75.6 °C)    Systolic (71ZFA), AXO:367 , Min:100 , MJK:898      Diastolic (06CFK), DAK:19, Min:57, Max:69       Intake and Output:        Date 22 - 22 - 22 0659   Shift 2591-1327 7548-5520 24 Hour Total 5613-1048 6960-4916 24 Hour Total   INTAKE   I.V. 862.5  862.5        Magnesium Sulfate Volume 175  175        Volume (lactated Ringers infusion) 562.5  562.5        Volume (lactated Ringers infusion) 125  125      Shift Total 862.5  862.5      OUTPUT   Urine 1300  1300        Urine Voided 1300  1300      Shift Total 1300  1300      NET -437.5  -437.5      Weight (kg)             Physical Exam:  Patient without distress. Heart: Regular rate and rhythm, S1S2 present or without murmur or extra heart sounds  Lung: clear to auscultation throughout lung fields, no wheezes, no rales, no rhonchi and normal respiratory effort  Abdomen: no pain to palpation or pressure in suprapubic region or RUQ  Lower Extremities:  - Edema No       Membranes:  Intact    Uterine Activity:  Off the monitor.  No uterine activity felt by patient in approx 16 hrs    Labs:   No results found for this or any previous visit (from the past 24 hour(s)). Assessment and Plan:      Naila Ureña is a 33 y.o.  F at 30w5d admitted for rule out  labor. Concern for  labor: Pt presenting at 30w5d with frequent, painful contractions, concerning for  labor. Positive fFN, therefore cannot rule out  labor in next couple of weeks. Reports no contractions or discomfort today. Continues on tocolytics  - Continue Indomethacin x 48 hours, Last dose today 1600  - S/p Magnesium gtt  - S/p betamethasone x2  - Possible D/C after last indomethacin dose at 1600 today    [facility] at 30w5d: A-/Ab screen positive. Unable to obtain prenatal records from previous OB  - Prenatal labs obtained with no abnormalities noted  - Pt to establish care with OB/GYN, plans to do so soon. Will update team with name of OB that she is planning on establishing care with. Rh negative status: S/p Rhogam per pt. Anemia: CBC 10.1 on admission, will need iron at point of d/c.      Patient discussed with Dr. Mathews Sensor By: Mary Ann Grant DO    Family Medicine Resident

## 2022-07-19 NOTE — PROGRESS NOTES
26 SBAR report received from Wellington Schmitz, CHIQUI. Care of PT handed over at this time. 1140 This RN discussing plan of care with residents. Residents say PT can discharge. PT would like to discuss some things with case management. [place] notified. Getting discharge instructions together at this time.

## 2022-07-19 NOTE — DISCHARGE INSTRUCTIONS
Weeks 30 to 32 of Your Pregnancy: Care Instructions  Overview     You've made it to the final months of your pregnancy! By now your baby is really starting to look like a baby, with hair and plump skin. As you enter the final weeks of pregnancy, the reality of having a baby may start to set in. This is a good time to set up a safe nursery and find quality  if needed. Doing this stuff ahead of time will allow you to focus on caring for and enjoying your new baby. You may also want to take a tour of your hospital's labor and delivery unit. This will help you get a better idea of what to expect while you're in the hospital.  During these last months, be sure to take good care of yourself. Pay attention to what your body needs. If your doctor says it's okay for you to work, don't push yourself too hard. If you haven't already had the Tdap shot during this pregnancy, talk to your doctor about getting it. It will help protect your  against pertussis infection. Follow-up care is a key part of your treatment and safety. Be sure to make and go to all appointments, and call your doctor if you are having problems. It's also a good idea to know your test results and keep a list of the medicines you take. How can you care for yourself at home? Pay attention to your baby's movements  · You should feel your baby move several times every day. · Your baby now turns less, and kicks and jabs more. · Your baby sleeps 20 to 45 minutes at a time and is more active at certain times of day. · If your doctor wants you to count your baby's kicks:  ? Empty your bladder, and lie on your side or relax in a comfortable chair. ? Write down your start time. ? Pay attention only to your baby's movements. Count any movement except hiccups. ? After you have counted 10 movements, write down your stop time. ? Write down how many minutes it took for your baby to move 10 times.   ? If an hour goes by and you have not recorded 10 movements, have something to eat or drink and then count for another hour. If you don't record at least 10 movements in the 2-hour period, call your doctor. Ease heartburn  · Eat small, frequent meals. · Do not eat chocolate, peppermint, or very spicy foods. Avoid drinks with caffeine, such as coffee, tea, and sodas. · Avoid bending over or lying down after meals. · Take a short walk after you eat. · If heartburn is a problem at night, do not eat for 2 hours before bedtime. · Take antacids like Mylanta, Maalox, Rolaids, or Tums. Do not take antacids that have sodium bicarbonate. Care for varicose veins  · Varicose veins are blood vessels that stretch out with the extra blood during pregnancy. Your legs may ache or throb. Most varicose veins will go away after the birth. · Avoid standing for long periods of time. Sit with your legs crossed at the ankles, not the knees. · Sit with your feet propped up. · Avoid tight clothing or stockings. Wear support hose. · Exercise regularly. Try walking for at least 30 minutes a day. Where can you learn more? Go to http://www.gray.com/  Enter X471 in the search box to learn more about \"Weeks 30 to 32 of Your Pregnancy: Care Instructions. \"  Current as of: June 16, 2021               Content Version: 13.2  © 1949-8265 [employer]. Care instructions adapted under license by [employer] (which disclaims liability or warranty for this information). If you have questions about a medical condition or this instruction, always ask your healthcare professional. [place] [address] any warranty or liability for your use of this information. Patient Education        Weeks 26 to 30 of Your Pregnancy: Care Instructions  Overview     You are now entering your last trimester of pregnancy. Your baby is growing quickly. Ghassan Stallings probably feel your baby moving around more often.  Your doctor may ask you to count your baby's kicks. Your back may ache as your body gets used to your baby's size and length. If you haven't already had the Tdap shot during this pregnancy, talk to your doctor about getting it. It will help protect your  against pertussis infection. During this time, it's important to take care of yourself and pay attention to what your body needs. If you feel sexual, you can explore ways to be close with your partner that match your comfort and desire. Follow-up care is a key part of your treatment and safety. Be sure to make and go to all appointments, and call your doctor if you are having problems. It's also a good idea to know your test results and keep a list of the medicines you take. How can you care for yourself at home? Take it easy at work  · Take frequent breaks. If possible, stop working when you are tired, and rest during your lunch hour. · Take bathroom breaks every 2 hours. · Change positions often. If you sit for long periods, stand up and walk around. · When you stand for a long time, keep one foot on a low stool with your knee bent. After standing a lot, sit with your feet up. · Avoid fumes, chemicals, and tobacco smoke. Be sexual in your own way  · Having sex during pregnancy is okay, unless your doctor tells you not to. · You may be very interested in sex, or you may have no interest at all. · Your growing belly can make it hard to find a good position during intercourse. [place] and explore. · You may get cramps in your uterus when your partner touches your breasts. · A back rub may relieve the backache or cramps that sometimes follow orgasm. Learn about  labor  · Watch for signs of  labor. You may be going into labor if:  ? You have menstrual-like cramps, with or without nausea. ? You have about 6 or more contractions in 1 hour, even after you have had a glass of water and are resting. ?  You have a low, dull backache that does not go away when you change your position. ? You have pain or pressure in your pelvis that comes and goes in a pattern. ? You have intestinal cramping or flu-like symptoms, with or without diarrhea.  ? You notice an increase or change in your vaginal discharge. Discharge may be heavy, mucus-like, watery, or streaked with blood. ? Your water breaks. · If you think you have  labor:  ? Drink 2 or 3 glasses of water or juice. Not drinking enough fluids can cause contractions. ? Stop what you are doing, and empty your bladder. Then lie down on your left side for at least 1 hour. ? While lying on your side, find your breast bone. Put your fingers in the soft spot just below it. Move your fingers down toward your belly button to find the top of your uterus. Check to see if it is tight. ? Contractions can be weak or strong. Record your contractions for an hour. Time a contraction from the start of one contraction to the start of the next one.  ? Single or several strong contractions without a pattern are called Medina-Urieb contractions. They are practice contractions but not the start of labor. They often stop if you change what you are doing. ? Call your doctor if you have regular contractions. Where can you learn more? Go to http://www.gray.com/  Enter R561 in the search box to learn more about \"Weeks 26 to 30 of Your Pregnancy: Care Instructions. \"  Current as of: 2021               Content Version: 13.2  © 6687-5741 [employer]. Care instructions adapted under license by [employer] (which disclaims liability or warranty for this information). If you have questions about a medical condition or this instruction, always ask your healthcare professional. [place] [address] any warranty or liability for your use of this information.          Patient Education        Learning About When to Call Your Doctor During Pregnancy (After 20 Weeks)  Overview  It's common to have concerns about what might be a problem when you're pregnant. Most pregnancies don't have any serious problems. But it's still important to know when to call your doctor if you have certain symptoms or signs of labor. These are general suggestions. Your doctor may give you some more information about when to call. When to call your doctor (after 20 weeks)  Call 911  anytime you think you may need emergency care. For example, call if:  · You have severe vaginal bleeding. · You have sudden, severe pain in your belly. · You passed out (lost consciousness). · You have a seizure. · You see or feel the umbilical cord. · You think you are about to deliver your baby and can't make it safely to the hospital.  Call your doctor now or seek immediate medical care if:  · You have vaginal bleeding. · You have belly pain. · You have a fever. · You have symptoms of preeclampsia, such as:  ? Sudden swelling of your face, hands, or feet. ? New vision problems (such as dimness, blurring, or seeing spots). ? A severe headache. · You have a sudden release of fluid from your vagina. (You think your water broke.)  · You think that you may be in labor. This means that you've had at least 6 contractions in an hour. · You notice that your baby has stopped moving or is moving much less than normal.  · You have symptoms of a urinary tract infection. These may include:  ? Pain or burning when you urinate. ? A frequent need to urinate without being able to pass much urine. ? Pain in the flank, which is just below the rib cage and above the waist on either side of the back. ? Blood in your urine. Watch closely for changes in your health, and be sure to contact your doctor if:  · You have vaginal discharge that smells bad. · You have skin changes, such as:  ? A rash. ? Itching. ? Yellow color to your skin. · You have other concerns about your pregnancy.   If you have labor signs at 37 weeks or more  If you have signs of labor at 37 weeks or more, your doctor may tell you to call when your labor becomes more active. Symptoms of active labor include:  · Contractions that are regular. · Contractions that are less than 5 minutes apart. · Contractions that are hard to talk through. Follow-up care is a key part of your treatment and safety. Be sure to make and go to all appointments, and call your doctor if you are having problems. It's also a good idea to know your test results and keep a list of the medicines you take. Where can you learn more? Go to http://www.gray.com/  Enter N531 in the search box to learn more about \"Learning About When to Call Your Doctor During Pregnancy (After 20 Weeks). \"  Current as of: 2021               Content Version: 13.2  © 1720-2758 [employer]. Care instructions adapted under license by [employer] (which disclaims liability or warranty for this information). If you have questions about a medical condition or this instruction, always ask your healthcare professional. [place] [address] any warranty or liability for your use of this information. Patient Education         Labor: Care Instructions  Overview      labor is the start of labor between [address] and 36 weeks of pregnancy. Most babies are born at 40 to 41 weeks of pregnancy. In labor, the uterus contracts to open the cervix. This is the first stage of childbirth.  labor can be caused by a problem with the baby, the mother, or both. Often the cause is not known. In some cases, doctors use medicines to try to delay labor until 29 or more weeks of pregnancy. By this time, a baby has grown enough so that problems are not likely. In some cases--such as with a serious infection--it is healthier for the baby to be born early.  Your treatment will depend on how far along you are in your pregnancy and on your health and your baby's health. Follow-up care is a key part of your treatment and safety. Be sure to make and go to all appointments, and call your doctor if you are having problems. It's also a good idea to know your test results and keep a list of the medicines you take. How can you care for yourself at home? · If your doctor prescribed medicines, take them exactly as directed. Call your doctor if you think you are having a problem with your medicine. · Rest until your doctor advises you about activity. · Do not have sexual intercourse unless your doctor says it is safe. · Use sanitary pads if you have vaginal bleeding. Using pads makes it easier to monitor your bleeding. · Do not smoke or allow others to smoke around you. If you need help quitting, talk to your doctor about stop-smoking programs and medicines. These can increase your chances of quitting for good. When should you call for help? Call 911  anytime you think you may need emergency care. For example, call if:    · You passed out (lost consciousness).     · You have a seizure.     · You have severe vaginal bleeding.     · You have severe pain in your belly or pelvis that doesn't get better between contractions.     · You have had fluid gushing or leaking from your vagina and you know or think the umbilical cord is bulging into your vagina. If this happens, immediately get down on your knees so your rear end (buttocks) is higher than your head. This will decrease the pressure on the cord until help arrives. Call your doctor now or seek immediate medical care if:    · You have signs of preeclampsia, such as:  ? Sudden swelling of your face, hands, or feet. ? New vision problems (such as dimness, blurring, or seeing spots). ? A severe headache.     · You have any vaginal bleeding.     · You have belly pain or cramping.     · You have a fever.     · You have had regular contractions (with or without pain) for an hour.  This means that you have 6 or more within 1 hour after you change your position and drink fluids.     · You have a sudden release of fluid from the vagina.     · You have low back pain or pelvic pressure that does not go away.     · You notice that your baby has stopped moving or is moving much less than normal.   Watch closely for changes in your health, and be sure to contact your doctor if you have any problems. Where can you learn more? Go to http://www.gray.com/  Enter Q400 in the search box to learn more about \" Labor: Care Instructions. \"  Current as of: 2021               Content Version: 13.2  © 1508-5093 [employer]. Care instructions adapted under license by [email or web address] (which disclaims liability or warranty for this information). If you have questions about a medical condition or this instruction, always ask your healthcare professional. [address] any warranty or liability for your use of this information.

## 2022-07-22 ENCOUNTER — ROUTINE PRENATAL (OUTPATIENT)
Dept: FAMILY MEDICINE CLINIC | Age: 32
End: 2022-07-22
Payer: COMMERCIAL

## 2022-07-22 VITALS
HEART RATE: 85 BPM | SYSTOLIC BLOOD PRESSURE: 111 MMHG | RESPIRATION RATE: 18 BRPM | BODY MASS INDEX: 30.23 KG/M2 | OXYGEN SATURATION: 98 % | TEMPERATURE: 98.1 F | WEIGHT: 154 LBS | HEIGHT: 60 IN | DIASTOLIC BLOOD PRESSURE: 68 MMHG

## 2022-07-22 DIAGNOSIS — Z34.90 ENCOUNTER FOR SUPERVISION OF NORMAL PREGNANCY, ANTEPARTUM, UNSPECIFIED GRAVIDITY: ICD-10-CM

## 2022-07-22 DIAGNOSIS — Z3A.31 31 WEEKS GESTATION OF PREGNANCY: Primary | ICD-10-CM

## 2022-07-22 PROCEDURE — 0502F SUBSEQUENT PRENATAL CARE: CPT | Performed by: STUDENT IN AN ORGANIZED HEALTH CARE EDUCATION/TRAINING PROGRAM

## 2022-07-22 NOTE — PROGRESS NOTES
Initial OB Visit     Subjective:   Rogelio Grier 33 y.o.   MARS: 2022, by LMP, confirmed by dating US  GA:  31w1d, here for initial OB visit (transferring care from [place])    Of note, Patient presented for severe contractions to ED on . FFN negative, but considering her contractions, initiated fetal lung/neuro protection as well as tocolytic therapy. S/p magnesium, PCN, betamethasone x2, 48hr course of indomethacin. She was then discharged on  when contractions went away and labor was ruled out. Concerns today: Dry mouth since discharge form hospital. Has been trying to hydrate more and will continue to do so. She also reports an incidental finding of fibroids but denies any symptoms from them. LOF: No  Vaginal bleeding: No  Fetal movement (after 20 weeks): Yes  Contractions: None since leaving the hospital    Pt denies fever, chills, HA, vision disturbances, RUQ pain, chest pain, SOB, N/V/D, constipation, urinary problems, foul smelling vaginal discharge, LE Edema worse than usual.       This is not a planned pregnancy. See flow sheet for OB history. History of Depression in pregnancy or post partum? No  History of GDM or DM? No  History of GHTN or HTN? No  History of pre-eclampsia? No  History of thyroid disorder? No  History of ? Yes  History of PTD? No  History of ? No  History of Genetic disorders? No  History of STD's? No  History of abnormal PAP? No  Taking prenatal vitamins? Yes    Allergies- reviewed:   No Known Allergies    Medications- reviewed:   Current Outpatient Medications   Medication Sig    PNV GC.68/DIXMITL fum/folic ac (PRENATAL PO) Take  by mouth. ferrous sulfate (Iron) 325 mg (65 mg iron) tablet Take 1 Tablet by mouth every other day. No current facility-administered medications for this visit. Past Medical History- reviewed:  History reviewed. No pertinent past medical history.     Past Surgical History- reviewed:   History reviewed. No pertinent surgical history. Social History- reviewed:  Social History     Socioeconomic History    Marital status: SINGLE     Spouse name: Not on file    Number of children: Not on file    Years of education: Not on file    Highest education level: Not on file   Occupational History    Not on file   Tobacco Use    Smoking status: Former     Packs/day: 0.50     Years: 12.00     Pack years: 6.00     Types: Cigarettes     Quit date:      Years since quittin.5    Smokeless tobacco: Never   Substance and Sexual Activity    Alcohol use: Not Currently    Drug use: Never    Sexual activity: Not on file   Other Topics Concern     Service Not Asked    Blood Transfusions Not Asked    Caffeine Concern Not Asked    Occupational Exposure Not Asked    Hobby Hazards Not Asked    Sleep Concern Not Asked    Stress Concern Not Asked    Weight Concern Not Asked    Special Diet Not Asked    Back Care Not Asked    Exercise Not Asked    Bike Helmet Not Asked    Seat Belt Not Asked    Self-Exams Not Asked   Social History Narrative    Not on file     Social Determinants of Health     Financial Resource Strain: Not on file   Food Insecurity: Not on file   Transportation Needs: Not on file   Physical Activity: Not on file   Stress: Not on file   Social Connections: Not on file   Intimate Partner Violence: Not on file   Housing Stability: Not on file         Objective:   Visit Vitals  /68 (BP 1 Location: Right arm, BP Patient Position: Sitting, BP Cuff Size: Small adult)   Pulse 85   Temp 98.1 °F (36.7 °C) (Oral)   Resp 18   Ht 5' (1.524 m)   Wt 154 lb (69.9 kg)   SpO2 98%   BMI 30.08 kg/m²       See physical exam on flowsheet      Labs:    No results found for this or any previous visit (from the past 12 hour(s)).       Assessment and Plan:   33 y.o.   MARS: 2022, by LMP, confirmed by dating US  GA:  31w1d here for intitial OB visit with us; transferring care from [place] (records scanned)    1. SIUP at 31w1d  -PNL:A NEGATIVE, HIV/G/C/RPR/Hep B neg, Rub/VZV immune   - 1hr GTT today  -Genetic testing: Screening (SMA carrier/Fragile X/Cystic Fibrosis/AFP) NEG  -Immunizations: Up to date per pt; cannot find Tdap immunization in records; discuss getting Tdap at next visit   -Ultrasound: Anatomy scan on     R/o  labor: Admitted on ; Cervix 2.7cm during contraction. Anemia: Last Hgb 10.1 (22)  - Continue iron supplementation every other day     Rh neg: s/p Rhogam x2 (, )        [address] 73 PLAN  Continuity Provider: Anna keith. in labor: Epidural   Feeding plan: Undecided  Circ if male: undecided   Social: Denies Tobacco, EtoH or illict drugs. Labor precautions discussed, including: Regular painful contractions, lasting for greater than one hour, taking your breath away; any vaginal bleeding; any leakage of fluid; or absent or decreased fetal movement. Call M.D. on call if any of these symptoms or signs occur. I have discussed the diagnosis with the patient and the intended plan as seen in the above orders. The patient has received an after-visit summary and questions were answered concerning future plans. I have discussed medication side effects and warnings with the patient as well. Informed pt to return to the office or go to the ER if she experiences vaginal bleeding, vaginal discharge, leaking of fluid, pelvic cramping.     Pt seen and discussed with Dr. Jack Escoto (attending physician)    Luz Barrios MD  Family Medicine Resident

## 2022-07-22 NOTE — PROGRESS NOTES
Identified Patient with two Patient identifiers (Name and ). Two Patient Identifiers confirmed. Reviewed record in preparation for visit and have obtained necessary documentation. Chief Complaint   Patient presents with    Routine Prenatal Visit     LMP 21 MARS 22 - 31w1d today. Patient denies true contractions since being in the hospital, + fetal movement, no vaginal bleeding or abnormal d/c. No pain currently. Visit Vitals  /68 (BP 1 Location: Right arm, BP Patient Position: Sitting, BP Cuff Size: Small adult)   Pulse 85   Temp 98.1 °F (36.7 °C) (Oral)   Resp 18   Ht 5' (1.524 m)   Wt 154 lb (69.9 kg)   SpO2 98%   BMI 30.08 kg/m²       1. Have you been to the ER, urgent care clinic since your last visit? Hospitalized since your last visit? No    2. Have you seen or consulted any other health care providers outside of the [address] [place] [address] since your last visit? Include any pap smears or colon screening.  No

## 2022-07-23 LAB — GLUCOSE 1H P 100 G GLC PO SERPL-MCNC: 97 MG/DL (ref 65–140)

## 2022-08-01 ENCOUNTER — ROUTINE PRENATAL (OUTPATIENT)
Dept: FAMILY MEDICINE CLINIC | Age: 32
End: 2022-08-01
Payer: COMMERCIAL

## 2022-08-01 VITALS
DIASTOLIC BLOOD PRESSURE: 77 MMHG | BODY MASS INDEX: 30.66 KG/M2 | SYSTOLIC BLOOD PRESSURE: 116 MMHG | WEIGHT: 157 LBS | RESPIRATION RATE: 16 BRPM | OXYGEN SATURATION: 98 %

## 2022-08-01 DIAGNOSIS — D25.9 UTERINE LEIOMYOMA, UNSPECIFIED LOCATION: ICD-10-CM

## 2022-08-01 DIAGNOSIS — Z3A.32 32 WEEKS GESTATION OF PREGNANCY: Primary | ICD-10-CM

## 2022-08-01 DIAGNOSIS — O43.123 VELAMENTOUS INSERTION OF UMBILICAL CORD IN THIRD TRIMESTER: ICD-10-CM

## 2022-08-01 DIAGNOSIS — N76.0 BACTERIAL VAGINOSIS: ICD-10-CM

## 2022-08-01 DIAGNOSIS — B96.89 BACTERIAL VAGINOSIS: ICD-10-CM

## 2022-08-01 DIAGNOSIS — B49 FUNGAL INFECTION: ICD-10-CM

## 2022-08-01 PROCEDURE — 0502F SUBSEQUENT PRENATAL CARE: CPT | Performed by: STUDENT IN AN ORGANIZED HEALTH CARE EDUCATION/TRAINING PROGRAM

## 2022-08-01 PROCEDURE — 90715 TDAP VACCINE 7 YRS/> IM: CPT | Performed by: STUDENT IN AN ORGANIZED HEALTH CARE EDUCATION/TRAINING PROGRAM

## 2022-08-01 RX ORDER — METRONIDAZOLE 500 MG/1
500 TABLET ORAL 2 TIMES DAILY
Qty: 14 TABLET | Refills: 0 | Status: SHIPPED | OUTPATIENT
Start: 2022-08-01 | End: 2022-08-08

## 2022-08-01 RX ORDER — ASPIRIN 325 MG
1 TABLET, DELAYED RELEASE (ENTERIC COATED) ORAL
Qty: 45 G | Refills: 0 | Status: SHIPPED | OUTPATIENT
Start: 2022-08-01 | End: 2022-08-17 | Stop reason: ALTCHOICE

## 2022-08-01 NOTE — PROGRESS NOTES
Return OB Visit       Subjective:   Kayden Pickens 33 y.o.   MARS: 2022, by Other Basis  GA:  32w4d. LOF: No  Vaginal bleeding: no  Fetal movement (after 20 weeks): yes  Contractions: no    Has increasing vaginal discharge, discharge is yellowish and vulvar pruritus since 3 days ago. Denies dysuria or foul smelling discharge   She is not sexually active  Symptoms present for the past few weeks, but vulvar pruritus started the last 3 days. 33 y.o.   s/p  labor r/o at 30 weeks 5 days. Presented for severe  contractions on . FFN negative, but considering her contractions, initiated fetal lung/neuro protection as well as tocolytic therapy. S/p magnesium, PCN, betamethasone x2. Currently finishing 48hr course of indomethacin. Last dose will be taken today outpatient at approx 1600    Pt denies fever, chills, HA, vision disturbances, RUQ pain, chest pain, SOB, N/V/D, constipation, urinary problems, foul smelling vaginal discharge, LE Edema worse than usual.     OB History    Para Term  AB Living   2       1     SAB IAB Ectopic Molar Multiple Live Births                    # Outcome Date GA Lbr Will/2nd Weight Sex Delivery Anes PTL Lv   2 Current            1 AB                Allergies- reviewed:   No Known Allergies  Medications- reviewed:   Current Outpatient Medications   Medication Sig    clotrimazole (MYCELEX) 1 % vaginal cream Insert 1 Applicator into vagina nightly. metroNIDAZOLE (FLAGYL) 500 mg tablet Take 1 Tablet by mouth two (2) times a day for 7 days. PNV DV.72/TJEVMHJ fum/folic ac (PRENATAL PO) Take  by mouth. ferrous sulfate (Iron) 325 mg (65 mg iron) tablet Take 1 Tablet by mouth every other day. No current facility-administered medications for this visit. Past Medical History- reviewed:  No past medical history on file. Past Surgical History- reviewed:   No past surgical history on file.   Social History- reviewed:  Social History Socioeconomic History    Marital status: SINGLE     Spouse name: Not on file    Number of children: Not on file    Years of education: Not on file    Highest education level: Not on file   Occupational History    Not on file   Tobacco Use    Smoking status: Former     Packs/day: 0.50     Years: 12.00     Pack years: 6.00     Types: Cigarettes     Quit date:      Years since quittin.5    Smokeless tobacco: Never   Substance and Sexual Activity    Alcohol use: Not Currently    Drug use: Never    Sexual activity: Not on file   Other Topics Concern     Service Not Asked    Blood Transfusions Not Asked    Caffeine Concern Not Asked    Occupational Exposure Not Asked    Hobby Hazards Not Asked    Sleep Concern Not Asked    Stress Concern Not Asked    Weight Concern Not Asked    Special Diet Not Asked    Back Care Not Asked    Exercise Not Asked    Bike Helmet Not Asked    Seat Belt Not Asked    Self-Exams Not Asked   Social History Narrative    Not on file     Social Determinants of Health     Financial Resource Strain: Not on file   Food Insecurity: Not on file   Transportation Needs: Not on file   Physical Activity: Not on file   Stress: Not on file   Social Connections: Not on file   Intimate Partner Violence: Not on file   Housing Stability: Not on file     Immunizations- reviewed:   Immunization History   Administered Date(s) Administered    Tdap 2022         Objective:   Visit Vitals  /77 (BP 1 Location: Left arm, BP Patient Position: Sitting, BP Cuff Size: Adult)   Resp 16   Wt 157 lb (71.2 kg)   LMP 2021   SpO2 98%   BMI 30.66 kg/m²       Physical Exam:  GENERAL APPEARANCE: alert, well appearing, in no apparent distress  ABDOMEN: gravid, see flowsheet  PSYCH: normal mood and affect     Labs  No results found for this or any previous visit (from the past 12 hour(s)).       Assessment         ICD-10-CM ICD-9-CM    1. 32 weeks gestation of pregnancy  Z3A.32 V22.2 TDAP, BOOSTRIX, (AGE 10 YRS+), IM      SC IMMUNIZ ADMIN,1 SINGLE/COMB VAC/TOXOID      AMB POC SMEAR, STAIN & INTERPRET, WET MOUNT      DRUG SCREEN, URINE      DRUG SCREEN, URINE      2. Fungal infection  B49 117.9 clotrimazole (MYCELEX) 1 % vaginal cream      3. Bacterial vaginosis  N76.0 616.10 metroNIDAZOLE (FLAGYL) 500 mg tablet    B96.89 041.9       4. Velamentous insertion of umbilical cord in third trimester  O43.123 663.83       5. Uterine leiomyoma, unspecified location  D25.9 218.9             Plan   33 y.o.  32w4d MARS 2022, by Other Basis here for return OB visit     1. SIUP at 32w4d  PNL:A NEGATIVE,  AB POSITIVE, HIV/G/C/RPR/Hep B neg, Rub/VZV immune, 1 hr GTT normal  -Genetic testing: Screening (SMA carrier/Fragile X/Cystic Fibrosis/AFP) NEG  -Immunizations: Tdap today  -Ultrasound: Anatomy scan on 8/3    Yeast and BV vaginitis: + KOH/ wet mount  - Clotrimazole  - Flagyl    Velamentous cord insertion: has MFM scan scheduled Aug 3    Uterine fibroid: noted on previous MFM scan in media, will f/up on scan report this week      R/o  labor: Admitted on ; Cervix 2.7cm during contraction. Anemia: Last Hgb 10.1 (22)  - Continue iron supplementation daily      Rh neg: repeat AB negative, s/p Rhogam x2 (, )    Transfer of care: from [place], records in media, checking UDS today      [id number]  Continuity Provider:  TBD  Pain mgmt. in labor:  TBD  Feeding plan:  TBD  Social: Denies Tobacco, EtoH or illict drugs. Orders Placed This Encounter    SC IMMUNIZ ADMIN,1 SINGLE/COMB VAC/TOXOID    TDAP, BOOSTRIX, (AGE 10 YRS+), IM    DRUG SCREEN, URINE     Standing Status:   Future     Number of Occurrences:   1     Standing Expiration Date:   2023    AMB POC WET PREP (AKA STAIN, INTERPRET, WET MOUNT)    clotrimazole (MYCELEX) 1 % vaginal cream     Sig: Insert 1 Applicator into vagina nightly.      Dispense:  45 g     Refill:  0    metroNIDAZOLE (FLAGYL) 500 mg tablet     Sig: Take 1 Tablet by mouth two (2) times a day for 7 days. Dispense:  14 Tablet     Refill:  0       Labor precautions discussed, including: Regular painful contractions, lasting for greater than one hour, taking your breath away; any vaginal bleeding; any leakage of fluid; or absent or decreased fetal movement. Call M.D. on call if any of these symptoms or signs occur. I have discussed the diagnosis with the patient and the intended plan as seen in the above orders. The patient has received an after-visit summary and questions were answered concerning future plans. I have discussed medication side effects and warnings with the patient as well. Informed pt to return to the office or go to the ER if she experiences vaginal bleeding, vaginal discharge, leaking of fluid, pelvic cramping.     Pt discussed with Dr. Harvinder Harris (attending physician)    Salvatore Rodrigues MD  Family Medicine Resident

## 2022-08-01 NOTE — PROGRESS NOTES
I reviewed with the resident the medical history and the resident's findings on the physical examination. I discussed with the resident the patient's diagnosis and concur with the plan.     28yo  @ 32w4d  IUP: NIPT low risk (in media), passed glucola   RH neg: s/p Rhogam , repeat Ab screen c/w Rhogam (received in April, too, apparently)  Yeast and BV vaginitis: treating today  Velamentous cord insertion: has MFM scan scheduled Aug 3  Uterine fibroid: noted on previous MFM scan in media, will f/up on scan report this week    Anemia: on iron   Transfer of care: from [place], records in media, checking UDS today

## 2022-08-02 LAB
AMPHET UR QL SCN: NEGATIVE
BARBITURATES UR QL SCN: NEGATIVE
BENZODIAZ UR QL: NEGATIVE
CANNABINOIDS UR QL SCN: POSITIVE
COCAINE UR QL SCN: NEGATIVE
DRUG SCRN COMMENT,DRGCM: ABNORMAL
METHADONE UR QL: NEGATIVE
OPIATES UR QL: NEGATIVE
PCP UR QL: NEGATIVE

## 2022-08-03 ENCOUNTER — HOSPITAL ENCOUNTER (OUTPATIENT)
Dept: PERINATAL CARE | Age: 32
Discharge: HOME | End: 2022-08-03
Attending: OBSTETRICS & GYNECOLOGY
Payer: MEDICAID

## 2022-08-03 PROCEDURE — 76816 OB US FOLLOW-UP PER FETUS: CPT | Performed by: OBSTETRICS & GYNECOLOGY

## 2022-08-06 ENCOUNTER — TELEPHONE (OUTPATIENT)
Dept: FAMILY MEDICINE CLINIC | Age: 32
End: 2022-08-06

## 2022-08-06 NOTE — PROGRESS NOTES
I reviewed lab results.   UDS: + THC  Called patient to discuss lab results  See telephone encounter

## 2022-08-06 NOTE — TELEPHONE ENCOUNTER
Called patient multiple times to discuss abnormal UDS results. No reply. Left voice message. I educated patient about the risks of marihuana use in pregnancy and encouraged cessation. Will attempt to call later today.     [address] MD

## 2022-08-17 ENCOUNTER — ROUTINE PRENATAL (OUTPATIENT)
Dept: FAMILY MEDICINE CLINIC | Age: 32
End: 2022-08-17
Payer: COMMERCIAL

## 2022-08-17 VITALS
TEMPERATURE: 97.5 F | OXYGEN SATURATION: 100 % | WEIGHT: 156 LBS | DIASTOLIC BLOOD PRESSURE: 77 MMHG | BODY MASS INDEX: 30.47 KG/M2 | SYSTOLIC BLOOD PRESSURE: 123 MMHG | HEART RATE: 92 BPM

## 2022-08-17 DIAGNOSIS — B37.31 VULVOVAGINAL CANDIDIASIS: ICD-10-CM

## 2022-08-17 DIAGNOSIS — Z3A.34 34 WEEKS GESTATION OF PREGNANCY: ICD-10-CM

## 2022-08-17 DIAGNOSIS — R03.0 ELEVATED BP WITHOUT DIAGNOSIS OF HYPERTENSION: Primary | ICD-10-CM

## 2022-08-17 LAB
ALBUMIN SERPL-MCNC: 2.4 G/DL (ref 3.5–5)
ALBUMIN/GLOB SERPL: 0.6 {RATIO} (ref 1.1–2.2)
ALP SERPL-CCNC: 64 U/L (ref 45–117)
ALT SERPL-CCNC: 16 U/L (ref 12–78)
ANION GAP SERPL CALC-SCNC: 11 MMOL/L (ref 5–15)
AST SERPL-CCNC: 12 U/L (ref 15–37)
BILIRUB SERPL-MCNC: 0.2 MG/DL (ref 0.2–1)
BUN SERPL-MCNC: 11 MG/DL (ref 6–20)
BUN/CREAT SERPL: 15 (ref 12–20)
CALCIUM SERPL-MCNC: 9 MG/DL (ref 8.5–10.1)
CHLORIDE SERPL-SCNC: 104 MMOL/L (ref 97–108)
CO2 SERPL-SCNC: 22 MMOL/L (ref 21–32)
CREAT SERPL-MCNC: 0.71 MG/DL (ref 0.55–1.02)
ERYTHROCYTE [DISTWIDTH] IN BLOOD BY AUTOMATED COUNT: 14.7 % (ref 11.5–14.5)
GLOBULIN SER CALC-MCNC: 4 G/DL (ref 2–4)
GLUCOSE SERPL-MCNC: 77 MG/DL (ref 65–100)
HCT VFR BLD AUTO: 31.4 % (ref 35–47)
HGB BLD-MCNC: 10 G/DL (ref 11.5–16)
MCH RBC QN AUTO: 28.4 PG (ref 26–34)
MCHC RBC AUTO-ENTMCNC: 31.8 G/DL (ref 30–36.5)
MCV RBC AUTO: 89.2 FL (ref 80–99)
NRBC # BLD: 0 K/UL (ref 0–0.01)
NRBC BLD-RTO: 0 PER 100 WBC
PLATELET # BLD AUTO: 335 K/UL (ref 150–400)
PMV BLD AUTO: 10 FL (ref 8.9–12.9)
POTASSIUM SERPL-SCNC: 3.9 MMOL/L (ref 3.5–5.1)
PROT SERPL-MCNC: 6.4 G/DL (ref 6.4–8.2)
RBC # BLD AUTO: 3.52 M/UL (ref 3.8–5.2)
SODIUM SERPL-SCNC: 137 MMOL/L (ref 136–145)
WBC # BLD AUTO: 15.5 K/UL (ref 3.6–11)

## 2022-08-17 PROCEDURE — 0502F SUBSEQUENT PRENATAL CARE: CPT | Performed by: STUDENT IN AN ORGANIZED HEALTH CARE EDUCATION/TRAINING PROGRAM

## 2022-08-17 RX ORDER — TERCONAZOLE 8 MG/G
1 CREAM VAGINAL
Qty: 12 G | Refills: 0 | Status: SHIPPED | OUTPATIENT
Start: 2022-08-17 | End: 2022-08-20

## 2022-08-17 NOTE — PROGRESS NOTES
Return OB Visit     Subjective:   Dolores Salazar 33 y.o.   MARS: 2022, by Other Basis  GA:  34w6d     Concerns today:   - Lower abdominal discomfort - irregular and infrequent, stretching like pain  - One episode of loose stool last week, but typically is constipated  - Yeast infection - was Rx'ed Clotrimazole cream, but states this does not help her. Requesting Terconazole cream, which has been effective for her in the past.    LOF: none  Vaginal bleeding: none  Fetal movement (after 20 weeks): present  Contractions: some Trevor-Uribe contractions    Pt denies fever, chills, HA, vision disturbances, RUQ pain, chest pain, SOB, N/V/D, constipation, urinary problems, foul smelling vaginal discharge, LE Edema worse than usual.     OB History    Para Term  AB Living   2       1     SAB IAB Ectopic Molar Multiple Live Births                    # Outcome Date GA Lbr Will/2nd Weight Sex Delivery Anes PTL Lv   2 Current            1 AB                Allergies- reviewed:   No Known Allergies  Medications- reviewed:   Current Outpatient Medications   Medication Sig    terconazole (TERAZOL 3) 0.8 % vaginal cream Insert 1 Applicator into vagina nightly for 3 doses. miscellaneous medical supply (Blood Pressure Cuff) misc 1 Each by Does Not Apply route daily. PNV MF.62/KFWPLAF fum/folic ac (PRENATAL PO) Take  by mouth. ferrous sulfate (Iron) 325 mg (65 mg iron) tablet Take 1 Tablet by mouth every other day. No current facility-administered medications for this visit. Past Medical History- reviewed:  History reviewed. No pertinent past medical history. Past Surgical History- reviewed:   History reviewed. No pertinent surgical history.   Social History- reviewed:  Social History     Socioeconomic History    Marital status: SINGLE     Spouse name: Not on file    Number of children: Not on file    Years of education: Not on file    Highest education level: Not on file   Occupational History    Not on file   Tobacco Use    Smoking status: Former     Packs/day: 0.50     Years: 12.00     Pack years: 6.00     Types: Cigarettes     Quit date:      Years since quittin.6    Smokeless tobacco: Never   Substance and Sexual Activity    Alcohol use: Not Currently    Drug use: Never    Sexual activity: Not on file   Other Topics Concern     Service Not Asked    Blood Transfusions Not Asked    Caffeine Concern Not Asked    Occupational Exposure Not Asked    Hobby Hazards Not Asked    Sleep Concern Not Asked    Stress Concern Not Asked    Weight Concern Not Asked    Special Diet Not Asked    Back Care Not Asked    Exercise Not Asked    Bike Helmet Not Asked    Seat Belt Not Asked    Self-Exams Not Asked   Social History Narrative    Not on file     Social Determinants of Health     Financial Resource Strain: Not on file   Food Insecurity: Not on file   Transportation Needs: Not on file   Physical Activity: Not on file   Stress: Not on file   Social Connections: Not on file   Intimate Partner Violence: Not on file   Housing Stability: Not on file     Immunizations- reviewed:   Immunization History   Administered Date(s) Administered    Tdap 2022     S/p Tdap    Objective:   Visit Vitals  /77 (BP 1 Location: Right upper arm, BP Patient Position: Sitting, BP Cuff Size: Adult)   Pulse 92   Temp 97.5 °F (36.4 °C) (Temporal)   Wt 156 lb (70.8 kg)   LMP 2021   SpO2 100%   BMI 30.47 kg/m²       Physical Exam:  GENERAL APPEARANCE: alert, well appearing, in no apparent distress  ABDOMEN: gravid, Fundal height 36cm FHT present at 155  bpm  PSYCH: normal mood and affect     **Note - FHT initially in 190s-200s, pt given time to rest and hydrate, on recheck, FHT averaged around 155bpm     Labs  No results found for this or any previous visit (from the past 12 hour(s)). Assessment         ICD-10-CM ICD-9-CM    1.  Elevated BP without diagnosis of hypertension  R03.0 796.2 CBC W/O DIFF METABOLIC PANEL, COMPREHENSIVE      PROTEIN/CREATININE RATIO, URINE      miscellaneous medical supply (Blood Pressure Cuff) misc      METABOLIC PANEL, COMPREHENSIVE      CBC W/O DIFF      PROTEIN/CREATININE RATIO, URINE      2. 34 weeks gestation of pregnancy  Z3A.34 V22.2 CBC W/O DIFF      METABOLIC PANEL, COMPREHENSIVE      PROTEIN/CREATININE RATIO, URINE      METABOLIC PANEL, COMPREHENSIVE      CBC W/O DIFF      PROTEIN/CREATININE RATIO, URINE      3. Vulvovaginal candidiasis  B37.3 112.1 terconazole (TERAZOL 3) 0.8 % vaginal cream            Plan   33 y.o.  34w6d MARS 2022, by LMP here for return OB visit     SIUP at 34w6d   - PNL: A negative,  Antibody pos, HIV/GC/CT/RPR/Hep B neg, Rubella/VZV immune, 1' GTT normal  -Genetic testing: Screening (SMA carrier/Fragile X/Cystic Fibrosis/AFP) neg  -Immunizations: S/p Tdap  -Ultrasound: 32w6d EFW 37%, normal anatomy, male fetus, cephalic, anterior placenta, normal 3v cord. PREGNANCY CONCERNS    Fetal tachycardia: Improved with rest + po hydration.  - Encouraged increased hydration    Elevated BP: Initially 159/100, but downtrended to 123/77 on recheck. [address] labs ordered. - BP cuff ordered, will f/up on BP readings in 1 week    Round ligament pain: Tylenol, maternity belt    Yeast and BV vaginitis: + KOH/ wet mount  - S/p Clotrimazole and flagyl  - Tercarzole cream Rx'ed today     Velamentous cord insertion: has MFM scan scheduled Aug 3  - Normal cord insertion on most recent ultrasound     Uterine fibroid: Noted in lower uterine segment, may obstruct delivery, where lower uterine segment incision may be made. - Will reach out to M to determine recommendations on optimal route of delivery. - Pelvic exam at next appt      contractions: Admitted on ; Cervix 2.7cm during contraction. Received tocolytics and betamethasone.       Anemia: Last Hgb 10.1 (22)  - Continue iron supplementation daily      Rh neg: repeat AB negative, s/p Rhogam x2 (, )     Transfer of care: from [place], records in media. Marijuana use in pregnancy: UDS +. Counseled on risks of THC use in pregnancy, no current use, last used at 4 months of pregnancy - stopped as soon as she found out she was pregnant. BIRTH PLAN  Continuity Provider: Darryl Carrera. in labor: Epidural if vaginal delivery  Feeding plan: breastfeeding primarily  PPBCP: TBD - given information, will discuss further at next visit. Circ if male: Desires circ  Pediatrician: JULY  Social: Denies Tobacco, EtoH or illict drugs. Orders Placed This Encounter    CBC W/O DIFF     Standing Status:   Future     Number of Occurrences:   1     Standing Expiration Date:       METABOLIC PANEL, COMPREHENSIVE     Standing Status:   Future     Number of Occurrences:   1     Standing Expiration Date:   2023    PROTEIN/CREATININE RATIO, URINE ([facility] Only)     Standing Status:   Future     Number of Occurrences:   1     Standing Expiration Date:   2023    terconazole (TERAZOL 3) 0.8 % vaginal cream     Sig: Insert 1 Applicator into vagina nightly for 3 doses. Dispense:  12 g     Refill:  0    miscellaneous medical supply (Blood Pressure Cuff) misc     Si Each by Does Not Apply route daily. Dispense:  1 Each     Refill:  0     Labor precautions discussed, including: Regular painful contractions, lasting for greater than one hour, taking your breath away; any vaginal bleeding; any leakage of fluid; or absent or decreased fetal movement. Call M.D. on call if any of these symptoms or signs occur. I have discussed the diagnosis with the patient and the intended plan as seen in the above orders. The patient has received an after-visit summary and questions were answered concerning future plans. I have discussed medication side effects and warnings with the patient as well.  Informed pt to return to the office or go to the ER if she experiences vaginal bleeding, vaginal discharge, leaking of fluid, pelvic cramping.     Pt seen and discussed with Dr. Mulu Hunter (attending physician)    Monik Agee MD  Family Medicine Resident

## 2022-08-17 NOTE — PROGRESS NOTES
I reviewed with the resident the medical history and the resident's findings on the physical examination. I discussed with the resident the patient's diagnosis and concur with the plan.     Transfer of care from [facility] last month for r/o PTL - got tocolytics, BMZ   Round ligament pain   Complaining of discharge/yeast infection - dx previously, requests other cream   Constipation   -200s    33yo  at 34w6d by --    IUP: NIPT low risk (in media), passed glucola   RH neg: s/p Rhogam , repeat Ab screen c/w Rhogam (received in April, too, apparently)  Yeast and BV vaginitis: will retreat today   Hx Velamentous cord insertion: noted to be normal insertion on MFM scan here 8/3   Uterine fibroid: noted on scan 8/3 - 6.7x8.7x5.7 midline LUTS - discussed with MFM/Dr. Mala Gusman - will plan for pelvic exam next visit, if cervix really posterior/ feel fibroid then may obstruct labor  make decision at that time regarding C/S versus trial of labor - note, fibroid is in location of where lower uterine incision would be    Anemia: on iron   [place] [facility]  Elevated BP: 159/100 was rushing into appt  check [facility] labs, check BP at home, preE  improved to 123/77  Fetal tachycardia: initially 190-200s, improved to 150s with hydration/rest for about 10 minutes  encouraged increased po fluids     Estimated Date of Delivery: 22

## 2022-08-17 NOTE — PROGRESS NOTES
Chief Complaint   Patient presents with    Routine Prenatal Visit     Visit Vitals  BP (!) 159/100 (BP 1 Location: Right upper arm, BP Patient Position: Sitting, BP Cuff Size: Adult long)   Pulse (!) 109   Temp 97.5 °F (36.4 °C) (Temporal)   Wt 156 lb (70.8 kg)   SpO2 100%   BMI 30.47 kg/m²

## 2022-08-18 ENCOUNTER — TELEPHONE (OUTPATIENT)
Dept: FAMILY MEDICINE CLINIC | Age: 32
End: 2022-08-18

## 2022-08-18 LAB
CREAT UR-MCNC: 85.6 MG/DL
PROT UR-MCNC: 17 MG/DL (ref 0–11.9)
PROT/CREAT UR-RTO: 0.2

## 2022-08-18 NOTE — TELEPHONE ENCOUNTER
Received call from patient. Patient reports feeling infrequent contraction type pains which started yesterday afternoon. Frequency anywhere between 5 min to every few hours. Feels similar to when she had  ctx which brought her in to the hospital back in July. No VB, LOF. Has good fetal movement. Advised patient to increase PO intake and rest. If symptoms not better in an hour, to report to L&D. Called L&D to inform that pt may arrive later this afternoon for prolonged monitoring.     Norma Chavez MD  [facility] Family Medicine Resident

## 2022-08-22 ENCOUNTER — DOCUMENTATION ONLY (OUTPATIENT)
Dept: FAMILY MEDICINE CLINIC | Age: 32
End: 2022-08-22

## 2022-08-22 NOTE — PROGRESS NOTES
Have attempted to contact patient several times, including on FOB's phone to discuss transferring care to [facility] given CEE fibroid, but no answer. Pt scheduled to come in for next prenatal on 8/23 so will plan to discuss then. Have communicated with resident who plans to see her.

## 2022-08-23 ENCOUNTER — ROUTINE PRENATAL (OUTPATIENT)
Dept: FAMILY MEDICINE CLINIC | Age: 32
End: 2022-08-23
Payer: COMMERCIAL

## 2022-08-23 DIAGNOSIS — Z3A.35 35 WEEKS GESTATION OF PREGNANCY: Primary | ICD-10-CM

## 2022-08-23 DIAGNOSIS — O99.013 ANEMIA DURING PREGNANCY IN THIRD TRIMESTER: ICD-10-CM

## 2022-08-23 DIAGNOSIS — R03.0 ELEVATED BP WITHOUT DIAGNOSIS OF HYPERTENSION: ICD-10-CM

## 2022-08-23 PROCEDURE — 0502F SUBSEQUENT PRENATAL CARE: CPT | Performed by: STUDENT IN AN ORGANIZED HEALTH CARE EDUCATION/TRAINING PROGRAM

## 2022-08-23 RX ORDER — LANOLIN ALCOHOL/MO/W.PET/CERES
325 CREAM (GRAM) TOPICAL EVERY OTHER DAY
Qty: 90 TABLET | Refills: 0 | Status: SHIPPED | OUTPATIENT
Start: 2022-08-23 | End: 2022-08-29 | Stop reason: SDUPTHER

## 2022-08-23 RX ORDER — TERCONAZOLE 4 MG/G
CREAM VAGINAL
COMMUNITY
Start: 2022-06-17 | End: 2022-10-14

## 2022-08-23 NOTE — PROGRESS NOTES
Soraya Raphael  32 y.o. female  1990  [address]  [id number]    [phone] (home)      [address]:    [facility] Telephone Encounter  Venita Marsh MD       Encounter Date: 2022 at 1:07 PM    Consent:  She and/or the health care decision maker is aware that this encounter will be billed as a routine OB appointment and that she may receive a bill for this telephone service, depending on her insurance coverage, and has provided verbal consent to proceed: Yes    Follow-up Prenatal     History of Present Illness   Contractions: none w rest/hydration. Trevor-Uribe  LOF: no  Vaginal bleeding: no  Fetal movement (if >20 wk): yes    BP: unable to obtain BP cuff. Vulvovaginal candidiasis: Using Terconazole - on day 2/3    Vitals/Objective:   General: Patient speaking in complete sentences without effort. Normal speech and cooperative. Due to this being a Virtual Check-in/Telephone evaluation, many elements of the physical examination are unable to be assessed. Assessment and Plan:   Soraya Raphael is a 33 y.o.  @ 35w5d evaluated by telephone. IUP at 35w5d  - PNL: A negative,  Antibody pos, HIV/GC/CT/RPR/Hep B neg, Rubella/VZV immune, 1' GTT normal  -Genetic testing: Screening (SMA carrier/Fragile X/Cystic Fibrosis/AFP) neg  -Immunizations: S/p Tdap  -Ultrasound: 32w6d EFW 37%, normal anatomy, male fetus, cephalic, anterior placenta, normal 3v cord. PREGNANCY CONCERNS     Elevated BP: Elevated to 159/100 during last visit, but downtrended to 123/77 on recheck. [address] labs wnl - baseline UPC 0.2, Cr, LFTs, and PLT all wnl.  - BP cuff ordered     Round ligament pain: Tylenol, maternity belt     Yeast and BV vaginitis: + KOH/ wet mount  - On day 2/3 of Terconazole, seems to be improving.      Velamentous cord insertion: has MFM scan scheduled Aug 3  - Normal cord insertion on most recent ultrasound     Uterine fibroid: Noted in lower uterine segment, may obstruct delivery, where lower uterine segment incision may be made. - Will transfer care to [facility], pt aware and amenable to plan      contractions: Admitted on ; Cervix 2.7cm during contraction. Received tocolytics and betamethasone. Anemia: Last Hgb 10.1 (22)  - Continue iron supplementation daily      Rh neg: repeat AB negative, s/p Rhogam x2 (, )     Transfer of care: from [place], records in media. Marijuana use in pregnancy: UDS +. Counseled on risks of THC use in pregnancy, no current use, last used at 4 months of pregnancy - stopped as soon as she found out she was pregnant. BIRTH PLAN  Pain mgmt. in labor: N/A, plan for   Feeding plan: breastfeeding primarily  PPBCP: TBD, pt provided w information on various methods  Circ if male: Desires circ  Pediatrician: JULY  Social: Denies Tobacco, EtoH or illict drugs. -Advised to come in sooner for any concerns  -Patient educated on kick counts (after 28 weeks): baby should move 10 times in 2 hours (can be a kick, roll, flutter, swish). -Patient was reminded about social distancing and to avoid going into public when possible. Patient understands that this encounter was a temporary measure, and the importance of further follow up and examination was emphasized. Patient verbalized understanding. I affirm this is a Patient Initiated Episode with an Established Patient who has not had a related appointment within my department in the past 7 days or scheduled within the next 24 hours. Note: not billable if this call serves to triage the patient into an appointment for the relevant concern      Electronically Signed: Nazario Hill MD  Providers location when delivering service: [place] ICD-9-CM    1. Anemia during pregnancy in third trimester  O99.013 648.23 ferrous sulfate (Iron) 325 mg (65 mg iron) tablet      2.  Elevated BP without diagnosis of hypertension R03.0 796.2           Pursuant to the emergency declaration under the [facility] waiver authority and the [employer] Act, this Virtual  Visit was conducted, with patient's consent, to reduce the patient's risk of exposure to COVID-19 and provide continuity of care for an established patient. History   Patients past medical, surgical and family histories were personally reviewed and updated. yes    Patient Active Problem List   Diagnosis Code     labor without delivery, third trimester O60.03    30 weeks gestation of pregnancy Z3A.30    32 weeks gestation of pregnancy Z3A.32    Fungal infection B49    Velamentous insertion of umbilical cord in third trimester O43.123    Uterine leiomyoma D25.9              Current Medications/Allergies   Medications and Allergies reviewed:    Current Outpatient Medications   Medication Sig Dispense Refill    ferrous sulfate (Iron) 325 mg (65 mg iron) tablet Take 1 Tablet by mouth every other day. 90 Tablet 0    terconazole (TERAZOL 7) 0.4 % vaginal cream PLACE 1 APPLICATORFUL VAGINALLY NIGHTLY FOR 7 DAYS. miscellaneous medical supply (Blood Pressure Cuff) misc 1 Each by Does Not Apply route daily. 1 Each 0    PNV KL.21/OMARQOL fum/folic ac (PRENATAL PO) Take  by mouth.        No Known Allergies

## 2022-08-28 NOTE — PROGRESS NOTES
[address] Medicine Residency Attending Addendum:  Dr. Geena Rueda MD,  the patient and I were not physically present during this encounter. The resident and I are concurrently monitoring the patient care through appropriate telecommunication technology. I discussed the findings, assessment and plan with the resident and agree with the resident's findings and plan as documented in the resident's note.       Gagan Yates MD

## 2022-08-29 ENCOUNTER — ROUTINE PRENATAL (OUTPATIENT)
Dept: FAMILY MEDICINE CLINIC | Age: 32
End: 2022-08-29
Payer: COMMERCIAL

## 2022-08-29 VITALS
HEIGHT: 60 IN | DIASTOLIC BLOOD PRESSURE: 71 MMHG | BODY MASS INDEX: 31.53 KG/M2 | SYSTOLIC BLOOD PRESSURE: 119 MMHG | RESPIRATION RATE: 17 BRPM | WEIGHT: 160.6 LBS | OXYGEN SATURATION: 96 % | HEART RATE: 91 BPM

## 2022-08-29 DIAGNOSIS — M54.50 ACUTE BILATERAL LOW BACK PAIN WITHOUT SCIATICA: ICD-10-CM

## 2022-08-29 DIAGNOSIS — O99.013 ANEMIA DURING PREGNANCY IN THIRD TRIMESTER: ICD-10-CM

## 2022-08-29 DIAGNOSIS — Z3A.36 36 WEEKS GESTATION OF PREGNANCY: Primary | ICD-10-CM

## 2022-08-29 DIAGNOSIS — D25.9 UTERINE LEIOMYOMA, UNSPECIFIED LOCATION: ICD-10-CM

## 2022-08-29 LAB
BILIRUB UR QL STRIP: NEGATIVE
GLUCOSE UR-MCNC: NEGATIVE MG/DL
KETONES P FAST UR STRIP-MCNC: NEGATIVE MG/DL
PH UR STRIP: 7 [PH] (ref 4.6–8)
PROT UR QL STRIP: NEGATIVE
SP GR UR STRIP: 1.01 (ref 1–1.03)
UA UROBILINOGEN AMB POC: NORMAL (ref 0.2–1)
URINALYSIS CLARITY POC: CLEAR
URINALYSIS COLOR POC: YELLOW
URINE BLOOD POC: NEGATIVE
URINE LEUKOCYTES POC: NEGATIVE
URINE NITRITES POC: NEGATIVE

## 2022-08-29 PROCEDURE — 81001 URINALYSIS AUTO W/SCOPE: CPT | Performed by: STUDENT IN AN ORGANIZED HEALTH CARE EDUCATION/TRAINING PROGRAM

## 2022-08-29 PROCEDURE — 0502F SUBSEQUENT PRENATAL CARE: CPT | Performed by: STUDENT IN AN ORGANIZED HEALTH CARE EDUCATION/TRAINING PROGRAM

## 2022-08-29 RX ORDER — LANOLIN ALCOHOL/MO/W.PET/CERES
325 CREAM (GRAM) TOPICAL EVERY OTHER DAY
Qty: 90 TABLET | Refills: 0 | Status: SHIPPED | OUTPATIENT
Start: 2022-08-29 | End: 2022-10-14

## 2022-08-29 NOTE — PROGRESS NOTES
Teleprecepting. I reviewed with the resident the medical history and the resident's findings on the physical examination. I discussed with the resident the patient's diagnosis and concur with the plan. Urinary leakage and pelvic pain  No LOF/VB, good movement, occasional Ish Highman     33yo  at 36w4d by --    IUP: NIPT low risk (in media), passed glucola   RH neg: s/p Rhogam , repeat Ab screen c/w Rhogam (received in April, too, apparently)  Yeast and BV vaginitis: will retreat today   Hx Velamentous cord insertion: noted to be normal insertion on MFM scan here 8/3   Uterine fibroid: noted on scan 8/3 - 6.7x8.7x5.7 midline LUTS - discussed with MFM/Dr. Nick Hummel and then Dr. Hesham Louis discussed at f/up - planned transfer of care to [place] [address] this week, appt scheduled.  Plan for C/S, patient aware  Anemia: HgB 10.0, on iron   Threatened PTL: around 30 weeks, received BMZ and tocolytics   Transfer of care: from [place] [address] with [facility]  Elevated BP x1 (159/100) :  asymptomatic, has been normal since  [address] labs wnl, UPC 0.2  sending in script for BP cuff for home monitoring   Urinary Leakage: U/A today     Estimated Date of Delivery: 22

## 2022-08-29 NOTE — PROGRESS NOTES
Return OB Visit     Subjective:   Devorah Ahn 33 y.o.   MARS: 2022, by Other Basis  GA:  36w4d. Concerns today: One episode of sharp low back pain earlier today, which woke her up from sleep, but then went away on its own. Has been drinking plenty of water. Had an episode of urinary urgency and leakage of urine a few days ago. Otherwise, no concerns. LOF: none  Vaginal bleeding: none  Fetal movement (after 20 weeks): present  Contractions: Trevor Uribe    Pt denies fever, chills, HA, vision disturbances, RUQ pain, chest pain, SOB, N/V/D, constipation, urinary problems, foul smelling vaginal discharge, LE Edema worse than usual.     OB History    Para Term  AB Living   2       1     SAB IAB Ectopic Molar Multiple Live Births                    # Outcome Date GA Lbr Will/2nd Weight Sex Delivery Anes PTL Lv   2 Current            1 AB                Allergies- reviewed:   No Known Allergies  Medications- reviewed:   Current Outpatient Medications   Medication Sig    ferrous sulfate (Iron) 325 mg (65 mg iron) tablet Take 1 Tablet by mouth every other day. PNV QC.75/QEJLZVI fum/folic ac (PRENATAL PO) Take  by mouth. terconazole (TERAZOL 7) 0.4 % vaginal cream PLACE 1 APPLICATORFUL VAGINALLY NIGHTLY FOR 7 DAYS. miscellaneous medical supply (Blood Pressure Cuff) misc 1 Each by Does Not Apply route daily. No current facility-administered medications for this visit. Past Medical History- reviewed:  History reviewed. No pertinent past medical history. Past Surgical History- reviewed:   History reviewed. No pertinent surgical history.   Social History- reviewed:  Social History     Socioeconomic History    Marital status: SINGLE     Spouse name: Not on file    Number of children: Not on file    Years of education: Not on file    Highest education level: Not on file   Occupational History    Not on file   Tobacco Use    Smoking status: Former     Packs/day: 0.50 Years: 12.00     Pack years: 6.00     Types: Cigarettes     Quit date:      Years since quittin.6    Smokeless tobacco: Never   Substance and Sexual Activity    Alcohol use: Not Currently    Drug use: Never    Sexual activity: Not on file   Other Topics Concern     Service Not Asked    Blood Transfusions Not Asked    Caffeine Concern Not Asked    Occupational Exposure Not Asked    Hobby Hazards Not Asked    Sleep Concern Not Asked    Stress Concern Not Asked    Weight Concern Not Asked    Special Diet Not Asked    Back Care Not Asked    Exercise Not Asked    Bike Helmet Not Asked    Seat Belt Not Asked    Self-Exams Not Asked   Social History Narrative    Not on file     Social Determinants of Health     Financial Resource Strain: Not on file   Food Insecurity: Not on file   Transportation Needs: Not on file   Physical Activity: Not on file   Stress: Not on file   Social Connections: Not on file   Intimate Partner Violence: Not on file   Housing Stability: Not on file     Immunizations- reviewed:   Immunization History   Administered Date(s) Administered    Rho(D) Immune Globulin - IM 2022    Tdap 2022     S/p Tdap and RhoGam.    Objective:   Visit Vitals  /71 (BP 1 Location: Right arm, BP Patient Position: Sitting)   Pulse 91   Resp 17   Ht 5' (1.524 m)   Wt 160 lb 9.6 oz (72.8 kg)   LMP 2021   SpO2 96%   BMI 31.37 kg/m²       Physical Exam:  GENERAL APPEARANCE: alert, well appearing, in no apparent distress  ABDOMEN: gravid, Fundal height 39cm FHT present at 140 bpm  PSYCH: normal mood and affect     Labs  Recent Results (from the past 12 hour(s))   AMB POC URINALYSIS DIP STICK AUTO W/ MICRO    Collection Time: 22  9:54 AM   Result Value Ref Range    Color (UA POC) Yellow     Clarity (UA POC) Clear     Glucose (UA POC) Negative Negative    Bilirubin (UA POC) Negative Negative    Ketones (UA POC) Negative Negative    Specific gravity (UA POC) 1.015 1.001 - 1.035 Blood (UA POC) Negative Negative    pH (UA POC) 7.0 4.6 - 8.0    Protein (UA POC) Negative Negative    Urobilinogen (UA POC) 0.2 mg/dL 0.2 - 1    Nitrites (UA POC) Negative Negative    Leukocyte esterase (UA POC) Negative Negative         Assessment         ICD-10-CM ICD-9-CM    1. Anemia during pregnancy in third trimester  O99.013 648.23 ferrous sulfate (Iron) 325 mg (65 mg iron) tablet      2. 36 weeks gestation of pregnancy  Z3A.36 V22.2       3. Uterine leiomyoma, unspecified location  D25.9 218.9       4. Acute bilateral low back pain without sciatica  M54.50 724.2 AMB POC URINALYSIS DIP STICK AUTO W/ MICRO     338.19 CULTURE, URINE      CULTURE, URINE            Plan   33 y.o.  36w4d MARS 2022, by Other Basis here for return OB visit     SIUP at 36w4d  - PNL: A negative,  Antibody pos, HIV/GC/CT/RPR/Hep B neg, Rubella/VZV immune, 1' GTT normal  -Genetic testing: Screening (SMA carrier/Fragile X/Cystic Fibrosis/AFP) neg  -Immunizations: S/p Tdap  -Ultrasound: 32w6d EFW 37%, normal anatomy, male fetus, cephalic, anterior placenta, normal 3v cord. PREGNANCY CONCERNS     Elevated BP: Elevated at  visit, but normotensive on recheck. [address] labs wnl - baseline UPC 0.2, Cr, LFTs, and PLT all wnl.  - Rx for BP cuff given today     Back pain/round ligament pain: Tylenol, maternity belt. - UA negative today. Yeast and BV vaginitis: + KOH/ wet mount. S/p 3 day Terconazole tx. Velamentous cord insertion: has MFM scan scheduled Aug 3  - Normal cord insertion on most recent ultrasound     Uterine fibroid: Measuring 67mm x 88mm x 58mm, lower uterine segment, anterior wall. Noted in lower uterine segment. Likely will obstruct labor, pt likely will need , to transfer care to [facility]  - Transfer of care appt scheduled with Dr. José Herrera on  at 1:20pm.        contractions: Admitted on 7/17; Cervix 2.7cm during contraction. Received tocolytics and betamethasone.       Anemia: Last Hgb 10.1 (22)  - Continue iron supplementation daily, Rx sent to [facility]     Rh neg: repeat AB negative, s/p Rhogam x2 (, )     Transfer of care: from [place], records in media. Marijuana use in pregnancy: UDS +. Counseled on risks of THC use in pregnancy, no current use, last used at 4 months of pregnancy - stopped as soon as she found out she was pregnant. BIRTH PLAN  Pain mgmt. in labor: N/A, plan for   Feeding plan: breastfeeding primarily  PPBCP: TBD, pt provided w information on various methods  Circ if male: Desires circ  Pediatrician: JULY  Social: Denies Tobacco, EtoH or illict drugs. Orders Placed This Encounter    CULTURE, URINE     Standing Status:   Future     Number of Occurrences:   1     Standing Expiration Date:   2023    AMB POC URINALYSIS DIP STICK AUTO W/ MICRO    ferrous sulfate (Iron) 325 mg (65 mg iron) tablet     Sig: Take 1 Tablet by mouth every other day. Dispense:  90 Tablet     Refill:  0     Labor precautions discussed, including: Regular painful contractions, lasting for greater than one hour, taking your breath away; any vaginal bleeding; any leakage of fluid; or absent or decreased fetal movement. Call M.D. on call if any of these symptoms or signs occur. I have discussed the diagnosis with the patient and the intended plan as seen in the above orders. The patient has received an after-visit summary and questions were answered concerning future plans. I have discussed medication side effects and warnings with the patient as well. Informed pt to return to the office or go to the ER if she experiences vaginal bleeding, vaginal discharge, leaking of fluid, pelvic cramping.     Pt seen and discussed with Dr. Kenia Knott (attending physician)    Tam Phillip MD  Family Medicine Resident

## 2022-08-29 NOTE — PROGRESS NOTES
Current pregnancy history:    Naila Ureña is a 33 y.o. female who presents for the evaluation of pregnancy. Transfer OB from Dr Chuy Horner. All records are in cc    Patient's last menstrual period was 2021. LMP history:  The date of her LMP is  certain. Her last menstrual period was normal and lasted for 4 to 5 days. Based on her LMP, her EDC is 2022 and her EGA is 37 weeks,0 days. Her menstrual cycles are regular and occur approximately every 28 days  and range from 3 to 5 days. Ultrasound data:  She had an  ultrasound done by the ultrasound tech on 8/3/2022 with Perinatology at [facility]  Pregnancy symptoms:    Since her LMP she has experienced  urinary frequency, breast tenderness, and nausea. She has not been vomiting over the last few weeks. Associated signs and symptoms which she denies: dysuria, discharge, vaginal bleeding. She states she has  gained weight:    Relevant past pregnancy history:   She has the following pregnancy history:Second pregnancy. Her last pregnancy was AB   She has no history of  delivery. Relevant past medical history:(relevant to this pregnancy): noncontributory. Pap/Occupational history:  Last pap smear: 2022 normal/HPV neg records in cc      Substance history: negative for alcohol, tobacco and street drugs. Positive for nothing. Exposure history: There is/are no indoor cat/s in the home. The patient was instructed to not change the cat litter. She admits close contact with children on a regular basis. She has had chicken pox or the vaccine in the past.   Patient denies issues with domestic violence. Genetic Screening/Teratology Counseling: (Includes patient, baby's father, or anyone in either family with:)  3.  [facility]?-- no  .   2.   Thalassemia ([place], [place], [address], or  background): MCV<80?--no.     3.  Neural tube defect (meningomyelocele, spina bifida, anencephaly)?--no.   4.  Congenital heart defect?--no.  5.  Down syndrome?--no.   6.  John-Sachs ([employer]ab[employer]ist, [place])?--no.   7.  Canavan's Disease?--no.   8.  Familial Dysautonomia?--no.   9.  Sickle cell disease or trait ()? --no   The patient has not been tested for sickle trait  10. Hemophilia or other blood disorders?--no. 11.  Muscular dystrophy?--no. 12.  Cystic fibrosis?--no. 13.  Sheree's Chorea?--no. 14.  Mental retardation/autism (if yes was person tested for Fragile X)?--no. 15.  Other inherited genetic or chromosomal disorder?--no. 12.  Maternal metabolic disorder (DM, PKU, etc)?--no. 17.  Patient or FOB with a child with a birth defect not listed above?--no.  17a. Patient or FOB with a birth defect themselves?--no. 18.  Recurrent pregnancy loss, or stillbirth?--no. 19.  Any medications since LMP other than prenatal vitamins (include vitamins,  supplements, OTC meds, drugs, alcohol)?--no. 20.  Any other genetic/environmental exposure to discuss?--no. Infection History:  1. Lives with someone with TB or TB exposed?--no.   2.  Patient or partner has history of genital herpes?--no.  3.  Rash or viral illness since LMP?--no.    4.  History of STD (GC, CT, HPV, syphilis, HIV)? --no   5. Other: OTHER? No past medical history on file. No past surgical history on file. Social History     Occupational History    Not on file   Tobacco Use    Smoking status: Former     Packs/day: 0.50     Years: 12.00     Pack years: 6.00     Types: Cigarettes     Quit date:      Years since quittin.6    Smokeless tobacco: Never   Substance and Sexual Activity    Alcohol use: Not Currently    Drug use: Never    Sexual activity: Not on file     No family history on file. No Known Allergies  Prior to Admission medications    Medication Sig Start Date End Date Taking? Authorizing Provider   ferrous sulfate (Iron) 325 mg (65 mg iron) tablet Take 1 Tablet by mouth every other day. 8/29/22   Lucila Lozoya MD   terconazole (TERAZOL 7) 0.4 % vaginal cream PLACE 1 APPLICATORFUL VAGINALLY NIGHTLY FOR 7 DAYS. 6/17/22   Provider, Historical   miscellaneous medical supply (Blood Pressure Cuff) misc 1 Each by Does Not Apply route daily. 8/17/22   Lucila Lozoya MD   PNV XV.00/IDTWUHF fum/folic ac (PRENATAL PO) Take  by mouth. Provider, Historical        Review of Systems: History obtained from the patient  Constitutional: negative for weight loss, fever, night sweats  HEENT: negative for hearing loss, earache, congestion, snoring, sorethroat  CV: negative for chest pain, palpitations, edema  Resp: negative for cough, shortness of breath, wheezing  Breast: negative for breast lumps, nipple discharge, galactorrhea  GI: negative for change in bowel habits, abdominal pain, black or bloody stools  : negative for frequency, dysuria, hematuria, vaginal discharge  MSK: negative for back pain, joint pain, muscle pain  Skin: negative for itching, rash, hives  Neuro: negative for dizziness, headache, confusion, weakness  Psych: negative for anxiety, depression, change in mood  Heme/lymph: negative for bleeding, bruising, pallor    Objective:  Visit Vitals  /75   Wt 158 lb 12.8 oz (72 kg)   LMP 12/16/2021   BMI 31.01 kg/m²       Physical Exam:   PHYSICAL EXAMINATION    Constitutional  Appearance: well-nourished, well developed, alert, in no acute distress    HENT  Head  Face: appears normal  Eyes: appear normal  Ears: normal  Mouth: normal  Lips: no lesions    Neck  Inspection/Palpation: normal appearance, no masses or tenderness  Lymph Nodes: no lymphadenopathy present  Thyroid: gland size normal, nontender, no nodules or masses present on palpation    Chest  Respiratory Effort: breathing unlabored  Auscultation: normal breath sounds    Cardiovascular  Heart:   Auscultation: regular rate and rhythm without murmur    Breasts  Inspection of Breasts: breasts symmetrical, no skin changes, no discharge present, nipple appearance normal, no skin retraction present  Palpation of Breasts and Axillae: no masses present on palpation, no breast tenderness  Axillary Lymph Nodes: no lymphadenopathy present    Gastrointestinal  Abdominal Examination: abdomen non-tender to palpation, normal bowel sounds, no masses present  Liver and spleen: no hepatomegaly present, spleen not palpable  Hernias: no hernias identified    Genitourinary  External Genitalia: normal appearance for age, no discharge present, no tenderness present, no inflammatory lesions present, no masses present, no atrophy present  Vagina: normal vaginal vault without central or paravaginal defects, no discharge present, no inflammatory lesions present, no masses present  Bladder: non-tender to palpation  Urethra: appears normal  Cervix: normal   Uterus: enlarged, normal shape, soft  Adnexa: no adnexal tenderness present, no adnexal masses present  Perineum: perineum within normal limits, no evidence of trauma, no rashes or skin lesions present  Anus: anus within normal limits, no hemorrhoids present  Inguinal Lymph Nodes: no lymphadenopathy present    Skin  General Inspection: no rash, no lesions identified    Neurologic/Psychiatric  Mental Status:  Orientation: grossly oriented to person, place and time  Mood and Affect: mood normal, affect appropriate    Assessment:   Intrauterine pregnancy with the following problems identified: ***. Plan:     Offered CF testing, CVS, Nuchal Translucency, MSAFP, amnio, and discussed NIPT  Course of pregnancy discussed including visit schedule, routine U/S, glucola testing, etc.  Avoid alcoholic beverages and illicit/recreational drugs use  Take prenatal vitamins or folic acid daily. Hospital and practice style discussed with coverage system.   Discussed nutrition, toxoplasmosis precautions, sexual activity, exercise, need for influenza vaccine, environmental and work hazards, travel advice, screen for domestic violence, need for seat belts. Discussed seafood, unpasteurized dairy products, deli meat, artificial sweeteners, and caffeine. Information on prenatal classes/breastfeeding given. Information on circumcision given  Patient encouraged not to smoke. Discussed current prescription drug use. Given medication list.  Discussed the use of over the counter medications and chemicals. Route of delivery discussed, including risks, benefits, and alternatives of  versus repeat LTCS. Pt understands risk of hemorrhage during pregnancy and post delivery and would accept blood products if necessary in life-threatening emergencies      Handouts given to pt.

## 2022-08-29 NOTE — PROGRESS NOTES
Chief Complaint   Patient presents with    Routine Prenatal Visit     36 week 4 day        Patient identified with 2 patient identifiers (name and D. O. B)    Patient is a  at 36w4d    Leakage of Fluid: NO  Vaginal Bleeding: NO  Fetal Movement if > 20 weeks: YES  Prenatal vitamins: YES  Having Contractions: NO  Pain: NO    Visit Vitals  /71 (BP 1 Location: Right arm, BP Patient Position: Sitting)   Pulse 91   Resp 17   Ht 5' (1.524 m)   Wt 160 lb 9.6 oz (72.8 kg)   LMP 2021   SpO2 96%   BMI 31.37 kg/m²       Immunization History   Administered Date(s) Administered    Tdap 2022       1. Have you been to the ER, urgent care clinic since your last visit? Hospitalized since your last visit? No    2. Have you seen or consulted any other health care providers outside of the [address] [place] [address] since your last visit? Include any pap smears or colon screening.  No     Woke up this morning to sharp back pain 8/10 that lasted around an hour

## 2022-08-30 LAB
BACTERIA SPEC CULT: NORMAL
SERVICE CMNT-IMP: NORMAL

## 2022-09-01 ENCOUNTER — INITIAL PRENATAL (OUTPATIENT)
Dept: OBGYN CLINIC | Age: 32
End: 2022-09-01
Payer: MEDICAID

## 2022-09-01 VITALS — BODY MASS INDEX: 31.01 KG/M2 | SYSTOLIC BLOOD PRESSURE: 136 MMHG | DIASTOLIC BLOOD PRESSURE: 75 MMHG | WEIGHT: 158.8 LBS

## 2022-09-01 DIAGNOSIS — Z3A.37 37 WEEKS GESTATION OF PREGNANCY: ICD-10-CM

## 2022-09-01 DIAGNOSIS — Z34.80 SUPERVISION OF OTHER NORMAL PREGNANCY, ANTEPARTUM: Primary | ICD-10-CM

## 2022-09-01 DIAGNOSIS — D25.9 UTERINE FIBROIDS AFFECTING PREGNANCY, THIRD TRIMESTER: ICD-10-CM

## 2022-09-01 DIAGNOSIS — O34.13 UTERINE FIBROIDS AFFECTING PREGNANCY, THIRD TRIMESTER: ICD-10-CM

## 2022-09-01 PROCEDURE — 0502F SUBSEQUENT PRENATAL CARE: CPT | Performed by: OBSTETRICS & GYNECOLOGY

## 2022-09-01 NOTE — PROGRESS NOTES
Pt transfer from [facility] due to large fibroid in [facility]. [place] at 32 weeks. She was seen by MFM where a large CEE fibroid is noted and concern for unable to deliver vaginally. Brief US by tech here shows vertex at least 2cm below the fibroid. Cervix is FT    I explained to the patient that if a  is needed she would have a vertical skin incision in order to assess whether there would need to be a classical versus a low transverse  performed. Due to the location of the fibroid I suspect she will need a classical  above the fibroid. I explained to the patient if she has a classical  she would always need C-sections for delivery in the future. I believe the baby will likely be able to deliver vaginally as there appears to be adequate space. We will have her see MFM again next week in order to reassess the size of the fibroid and see if they agree. Labor precautions were discussed with the patient. 1. IUP: NIPT low risk (in media), passed glucola   2. RH neg: s/p Rhogam , repeat Ab screen c/w Rhogam (received in April, too, apparently)  3. Hx Velamentous cord insertion: noted to be normal insertion on MFM scan here 8/3   4. Uterine fibroid: noted on scan 8/3 - 6.7x8.7x5.7 midline LUTS - discussed with MFM/Dr. Tommy Lawler and then Dr. Mayra Lockett discussed at f/up - planned transfer of care to [facility] this week, appt scheduled. Plan for C/S, patient aware  5. Anemia: HgB 10.0, on iron   6. Threatened PTL: around 30 weeks, received BMZ and tocolytics   7. Transfer of care: from [place] [id number] with ANDRA  8.  Elevated BP x1 (159/100) :  asymptomatic, has been normal since  [facility] labs wnl, UPC 0.2  sending in script for BP cuff for home monitoring

## 2022-09-02 ENCOUNTER — ANESTHESIA (OUTPATIENT)
Dept: LABOR AND DELIVERY | Age: 32
DRG: 542 | End: 2022-09-02
Payer: MEDICAID

## 2022-09-02 ENCOUNTER — HOSPITAL ENCOUNTER (INPATIENT)
Age: 32
LOS: 2 days | Discharge: HOME | DRG: 542 | End: 2022-09-04
Attending: OBSTETRICS & GYNECOLOGY | Admitting: OBSTETRICS & GYNECOLOGY
Payer: MEDICAID

## 2022-09-02 ENCOUNTER — ANESTHESIA EVENT (OUTPATIENT)
Dept: LABOR AND DELIVERY | Age: 32
DRG: 542 | End: 2022-09-02
Payer: MEDICAID

## 2022-09-02 DIAGNOSIS — Z3A.37 37 WEEKS GESTATION OF PREGNANCY: Primary | ICD-10-CM

## 2022-09-02 PROBLEM — Z34.90 PREGNANCY: Status: ACTIVE | Noted: 2022-09-02

## 2022-09-02 PROBLEM — Z3A.32 32 WEEKS GESTATION OF PREGNANCY: Status: RESOLVED | Noted: 2022-08-01 | Resolved: 2022-09-02

## 2022-09-02 PROBLEM — O42.92 FULL-TERM PREMATURE RUPTURE OF MEMBRANES: Status: ACTIVE | Noted: 2022-09-02

## 2022-09-02 PROBLEM — D25.9: Status: ACTIVE | Noted: 2022-08-01

## 2022-09-02 PROBLEM — O34.13: Status: ACTIVE | Noted: 2022-08-01

## 2022-09-02 PROBLEM — O99.013 ANEMIA AFFECTING PREGNANCY IN THIRD TRIMESTER: Status: ACTIVE | Noted: 2022-09-02

## 2022-09-02 LAB
ALBUMIN SERPL-MCNC: 2.5 G/DL (ref 3.5–5)
ALBUMIN/GLOB SERPL: 0.6 {RATIO} (ref 1.1–2.2)
ALP SERPL-CCNC: 73 U/L (ref 45–117)
ALT SERPL-CCNC: 17 U/L (ref 12–78)
AMPHET UR QL SCN: NEGATIVE
ANION GAP SERPL CALC-SCNC: 9 MMOL/L (ref 5–15)
AST SERPL-CCNC: 18 U/L (ref 15–37)
BARBITURATES UR QL SCN: NEGATIVE
BASOPHILS # BLD: 0.1 K/UL (ref 0–0.1)
BASOPHILS NFR BLD: 0 % (ref 0–1)
BENZODIAZ UR QL: NEGATIVE
BILIRUB SERPL-MCNC: 0.3 MG/DL (ref 0.2–1)
BUN SERPL-MCNC: 15 MG/DL (ref 6–20)
BUN/CREAT SERPL: 15 (ref 12–20)
CALCIUM SERPL-MCNC: 9 MG/DL (ref 8.5–10.1)
CANNABINOIDS UR QL SCN: NEGATIVE
CHLORIDE SERPL-SCNC: 108 MMOL/L (ref 97–108)
CO2 SERPL-SCNC: 20 MMOL/L (ref 21–32)
COCAINE UR QL SCN: NEGATIVE
CREAT SERPL-MCNC: 0.98 MG/DL (ref 0.55–1.02)
DAILY QC (YES/NO)?: YES
DIFFERENTIAL METHOD BLD: ABNORMAL
DRUG SCRN COMMENT,DRGCM: NORMAL
EOSINOPHIL # BLD: 0.2 K/UL (ref 0–0.4)
EOSINOPHIL NFR BLD: 1 % (ref 0–7)
ERYTHROCYTE [DISTWIDTH] IN BLOOD BY AUTOMATED COUNT: 14.6 % (ref 11.5–14.5)
GLOBULIN SER CALC-MCNC: 4.2 G/DL (ref 2–4)
GLUCOSE SERPL-MCNC: 99 MG/DL (ref 65–100)
HCT VFR BLD AUTO: 30.4 % (ref 35–47)
HGB BLD-MCNC: 9.9 G/DL (ref 11.5–16)
HISTORY CHECKED?,CKHIST: NORMAL
IMM GRANULOCYTES # BLD AUTO: 0.3 K/UL (ref 0–0.04)
IMM GRANULOCYTES NFR BLD AUTO: 2 % (ref 0–0.5)
LYMPHOCYTES # BLD: 2.6 K/UL (ref 0.8–3.5)
LYMPHOCYTES NFR BLD: 18 % (ref 12–49)
MCH RBC QN AUTO: 27.5 PG (ref 26–34)
MCHC RBC AUTO-ENTMCNC: 32.6 G/DL (ref 30–36.5)
MCV RBC AUTO: 84.4 FL (ref 80–99)
METHADONE UR QL: NEGATIVE
MONOCYTES # BLD: 1.1 K/UL (ref 0–1)
MONOCYTES NFR BLD: 8 % (ref 5–13)
NEUTS SEG # BLD: 10 K/UL (ref 1.8–8)
NEUTS SEG NFR BLD: 71 % (ref 32–75)
NRBC # BLD: 0 K/UL (ref 0–0.01)
NRBC BLD-RTO: 0 PER 100 WBC
OPIATES UR QL: NEGATIVE
PCP UR QL: NEGATIVE
PH, VAGINAL FLUID: 7 (ref 5–6.1)
PLATELET # BLD AUTO: 398 K/UL (ref 150–400)
PMV BLD AUTO: 10.2 FL (ref 8.9–12.9)
POTASSIUM SERPL-SCNC: 4.2 MMOL/L (ref 3.5–5.1)
PROT SERPL-MCNC: 6.7 G/DL (ref 6.4–8.2)
RBC # BLD AUTO: 3.6 M/UL (ref 3.8–5.2)
SODIUM SERPL-SCNC: 137 MMOL/L (ref 136–145)
WBC # BLD AUTO: 14.3 K/UL (ref 3.6–11)

## 2022-09-02 PROCEDURE — 75410000002 HC LABOR FEE PER 1 HR: Performed by: OBSTETRICS & GYNECOLOGY

## 2022-09-02 PROCEDURE — 86870 RBC ANTIBODY IDENTIFICATION: CPT

## 2022-09-02 PROCEDURE — 00HU33Z INSERTION OF INFUSION DEVICE INTO SPINAL CANAL, PERCUTANEOUS APPROACH: ICD-10-PCS | Performed by: NURSE ANESTHETIST, CERTIFIED REGISTERED

## 2022-09-02 PROCEDURE — 85025 COMPLETE CBC W/AUTO DIFF WBC: CPT

## 2022-09-02 PROCEDURE — 86920 COMPATIBILITY TEST SPIN: CPT

## 2022-09-02 PROCEDURE — 74011250636 HC RX REV CODE- 250/636: Performed by: OBSTETRICS & GYNECOLOGY

## 2022-09-02 PROCEDURE — 75410000003 HC RECOV DEL/VAG/CSECN EA 0.5 HR: Performed by: OBSTETRICS & GYNECOLOGY

## 2022-09-02 PROCEDURE — 74011250637 HC RX REV CODE- 250/637: Performed by: OBSTETRICS & GYNECOLOGY

## 2022-09-02 PROCEDURE — 77030021125

## 2022-09-02 PROCEDURE — 86921 COMPATIBILITY TEST INCUBATE: CPT

## 2022-09-02 PROCEDURE — 99283 EMERGENCY DEPT VISIT LOW MDM: CPT

## 2022-09-02 PROCEDURE — 75410000000 HC DELIVERY VAGINAL/SINGLE: Performed by: OBSTETRICS & GYNECOLOGY

## 2022-09-02 PROCEDURE — 86644 CMV ANTIBODY: CPT

## 2022-09-02 PROCEDURE — 83986 ASSAY PH BODY FLUID NOS: CPT | Performed by: OBSTETRICS & GYNECOLOGY

## 2022-09-02 PROCEDURE — 65270000029 HC RM PRIVATE

## 2022-09-02 PROCEDURE — 80307 DRUG TEST PRSMV CHEM ANLYZR: CPT

## 2022-09-02 PROCEDURE — 74011000250 HC RX REV CODE- 250: Performed by: NURSE ANESTHETIST, CERTIFIED REGISTERED

## 2022-09-02 PROCEDURE — 59410 OBSTETRICAL CARE: CPT | Performed by: OBSTETRICS & GYNECOLOGY

## 2022-09-02 PROCEDURE — 0DQR0ZZ REPAIR ANAL SPHINCTER, OPEN APPROACH: ICD-10-PCS | Performed by: OBSTETRICS & GYNECOLOGY

## 2022-09-02 PROCEDURE — 80053 COMPREHEN METABOLIC PANEL: CPT

## 2022-09-02 PROCEDURE — 86900 BLOOD TYPING SEROLOGIC ABO: CPT

## 2022-09-02 PROCEDURE — 75810000275 HC EMERGENCY DEPT VISIT NO LEVEL OF CARE

## 2022-09-02 PROCEDURE — 74011000250 HC RX REV CODE- 250: Performed by: ANESTHESIOLOGY

## 2022-09-02 PROCEDURE — 86922 COMPATIBILITY TEST ANTIGLOB: CPT

## 2022-09-02 PROCEDURE — 36415 COLL VENOUS BLD VENIPUNCTURE: CPT

## 2022-09-02 PROCEDURE — 76060000078 HC EPIDURAL ANESTHESIA: Performed by: NURSE ANESTHETIST, CERTIFIED REGISTERED

## 2022-09-02 RX ORDER — DIPHENHYDRAMINE HCL 25 MG
25 CAPSULE ORAL
Status: DISCONTINUED | OUTPATIENT
Start: 2022-09-02 | End: 2022-09-04 | Stop reason: HOSPADM

## 2022-09-02 RX ORDER — OXYTOCIN/RINGER'S LACTATE 30/500 ML
10 PLASTIC BAG, INJECTION (ML) INTRAVENOUS AS NEEDED
Status: DISCONTINUED | OUTPATIENT
Start: 2022-09-02 | End: 2022-09-02 | Stop reason: SDUPTHER

## 2022-09-02 RX ORDER — PROCHLORPERAZINE EDISYLATE 5 MG/ML
10 INJECTION INTRAMUSCULAR; INTRAVENOUS
Status: DISCONTINUED | OUTPATIENT
Start: 2022-09-02 | End: 2022-09-04 | Stop reason: HOSPADM

## 2022-09-02 RX ORDER — SIMETHICONE 80 MG
80 TABLET,CHEWABLE ORAL
Status: DISCONTINUED | OUTPATIENT
Start: 2022-09-02 | End: 2022-09-04 | Stop reason: HOSPADM

## 2022-09-02 RX ORDER — EPHEDRINE SULFATE/0.9% NACL/PF 50 MG/5 ML
10 SYRINGE (ML) INTRAVENOUS
Status: DISCONTINUED | OUTPATIENT
Start: 2022-09-02 | End: 2022-09-02

## 2022-09-02 RX ORDER — FENTANYL/BUPIVACAINE/NS/PF 2-1250MCG
12 PREFILLED PUMP RESERVOIR EPIDURAL CONTINUOUS
Status: DISCONTINUED | OUTPATIENT
Start: 2022-09-02 | End: 2022-09-02

## 2022-09-02 RX ORDER — HYDROCODONE BITARTRATE AND ACETAMINOPHEN 5; 325 MG/1; MG/1
1 TABLET ORAL
Status: DISCONTINUED | OUTPATIENT
Start: 2022-09-02 | End: 2022-09-04 | Stop reason: HOSPADM

## 2022-09-02 RX ORDER — OXYTOCIN/RINGER'S LACTATE 30/500 ML
0-20 PLASTIC BAG, INJECTION (ML) INTRAVENOUS
Status: DISCONTINUED | OUTPATIENT
Start: 2022-09-02 | End: 2022-09-02 | Stop reason: SDUPTHER

## 2022-09-02 RX ORDER — SODIUM CHLORIDE 0.9 % (FLUSH) 0.9 %
5-40 SYRINGE (ML) INJECTION AS NEEDED
Status: DISCONTINUED | OUTPATIENT
Start: 2022-09-02 | End: 2022-09-04 | Stop reason: HOSPADM

## 2022-09-02 RX ORDER — OXYTOCIN/RINGER'S LACTATE 30/500 ML
87.3 PLASTIC BAG, INJECTION (ML) INTRAVENOUS AS NEEDED
Status: DISCONTINUED | OUTPATIENT
Start: 2022-09-02 | End: 2022-09-04 | Stop reason: HOSPADM

## 2022-09-02 RX ORDER — BUPIVACAINE HYDROCHLORIDE 2.5 MG/ML
INJECTION, SOLUTION EPIDURAL; INFILTRATION; INTRACAUDAL AS NEEDED
Status: DISCONTINUED | OUTPATIENT
Start: 2022-09-02 | End: 2022-09-02 | Stop reason: HOSPADM

## 2022-09-02 RX ORDER — LIDOCAINE HYDROCHLORIDE AND EPINEPHRINE 15; 5 MG/ML; UG/ML
INJECTION, SOLUTION EPIDURAL AS NEEDED
Status: DISCONTINUED | OUTPATIENT
Start: 2022-09-02 | End: 2022-09-02 | Stop reason: HOSPADM

## 2022-09-02 RX ORDER — LIDOCAINE HYDROCHLORIDE 10 MG/ML
20 INJECTION INFILTRATION; PERINEURAL ONCE
Status: DISCONTINUED | OUTPATIENT
Start: 2022-09-02 | End: 2022-09-02

## 2022-09-02 RX ORDER — HYDROCORTISONE ACETATE PRAMOXINE HCL 2.5; 1 G/100G; G/100G
CREAM TOPICAL AS NEEDED
Status: DISCONTINUED | OUTPATIENT
Start: 2022-09-02 | End: 2022-09-02

## 2022-09-02 RX ORDER — SODIUM CHLORIDE, SODIUM LACTATE, POTASSIUM CHLORIDE, CALCIUM CHLORIDE 600; 310; 30; 20 MG/100ML; MG/100ML; MG/100ML; MG/100ML
125 INJECTION, SOLUTION INTRAVENOUS CONTINUOUS
Status: DISCONTINUED | OUTPATIENT
Start: 2022-09-02 | End: 2022-09-02

## 2022-09-02 RX ORDER — IBUPROFEN 800 MG/1
800 TABLET ORAL
Qty: 60 TABLET | Refills: 0 | Status: SHIPPED | OUTPATIENT
Start: 2022-09-02 | End: 2022-10-14

## 2022-09-02 RX ORDER — NALOXONE HYDROCHLORIDE 0.4 MG/ML
0.4 INJECTION, SOLUTION INTRAMUSCULAR; INTRAVENOUS; SUBCUTANEOUS AS NEEDED
Status: DISCONTINUED | OUTPATIENT
Start: 2022-09-02 | End: 2022-09-02

## 2022-09-02 RX ORDER — ONDANSETRON 2 MG/ML
4 INJECTION INTRAMUSCULAR; INTRAVENOUS
Status: DISCONTINUED | OUTPATIENT
Start: 2022-09-02 | End: 2022-09-02

## 2022-09-02 RX ORDER — IBUPROFEN 800 MG/1
800 TABLET ORAL EVERY 8 HOURS
Status: DISCONTINUED | OUTPATIENT
Start: 2022-09-02 | End: 2022-09-04 | Stop reason: HOSPADM

## 2022-09-02 RX ORDER — NALBUPHINE HYDROCHLORIDE 10 MG/ML
10 INJECTION, SOLUTION INTRAMUSCULAR; INTRAVENOUS; SUBCUTANEOUS
Status: DISCONTINUED | OUTPATIENT
Start: 2022-09-02 | End: 2022-09-04 | Stop reason: HOSPADM

## 2022-09-02 RX ORDER — ONDANSETRON 4 MG/1
4 TABLET, ORALLY DISINTEGRATING ORAL
Status: ACTIVE | OUTPATIENT
Start: 2022-09-02 | End: 2022-09-03

## 2022-09-02 RX ORDER — OXYTOCIN/RINGER'S LACTATE 30/500 ML
0-20 PLASTIC BAG, INJECTION (ML) INTRAVENOUS
Status: DISCONTINUED | OUTPATIENT
Start: 2022-09-02 | End: 2022-09-02

## 2022-09-02 RX ORDER — NALOXONE HYDROCHLORIDE 0.4 MG/ML
0.4 INJECTION, SOLUTION INTRAMUSCULAR; INTRAVENOUS; SUBCUTANEOUS AS NEEDED
Status: DISCONTINUED | OUTPATIENT
Start: 2022-09-02 | End: 2022-09-04 | Stop reason: HOSPADM

## 2022-09-02 RX ORDER — OXYTOCIN/RINGER'S LACTATE 30/500 ML
10 PLASTIC BAG, INJECTION (ML) INTRAVENOUS AS NEEDED
Status: DISCONTINUED | OUTPATIENT
Start: 2022-09-02 | End: 2022-09-04 | Stop reason: HOSPADM

## 2022-09-02 RX ORDER — DOCUSATE SODIUM 100 MG/1
100 CAPSULE, LIQUID FILLED ORAL 2 TIMES DAILY
Status: DISCONTINUED | OUTPATIENT
Start: 2022-09-02 | End: 2022-09-04 | Stop reason: HOSPADM

## 2022-09-02 RX ORDER — OXYTOCIN/RINGER'S LACTATE 30/500 ML
87.3 PLASTIC BAG, INJECTION (ML) INTRAVENOUS AS NEEDED
Status: DISCONTINUED | OUTPATIENT
Start: 2022-09-02 | End: 2022-09-02 | Stop reason: SDUPTHER

## 2022-09-02 RX ORDER — MAG HYDROX/ALUMINUM HYD/SIMETH 200-200-20
30 SUSPENSION, ORAL (FINAL DOSE FORM) ORAL
Status: DISCONTINUED | OUTPATIENT
Start: 2022-09-02 | End: 2022-09-02 | Stop reason: HOSPADM

## 2022-09-02 RX ORDER — ZOLPIDEM TARTRATE 5 MG/1
5 TABLET ORAL
Status: DISCONTINUED | OUTPATIENT
Start: 2022-09-02 | End: 2022-09-04 | Stop reason: HOSPADM

## 2022-09-02 RX ADMIN — SODIUM CHLORIDE, POTASSIUM CHLORIDE, SODIUM LACTATE AND CALCIUM CHLORIDE 125 ML/HR: 600; 310; 30; 20 INJECTION, SOLUTION INTRAVENOUS at 12:38

## 2022-09-02 RX ADMIN — SODIUM CHLORIDE, POTASSIUM CHLORIDE, SODIUM LACTATE AND CALCIUM CHLORIDE 999 ML/HR: 600; 310; 30; 20 INJECTION, SOLUTION INTRAVENOUS at 10:10

## 2022-09-02 RX ADMIN — OXYTOCIN 2 MILLI-UNITS/MIN: 10 INJECTION, SOLUTION INTRAMUSCULAR; INTRAVENOUS at 12:00

## 2022-09-02 RX ADMIN — OXYTOCIN 2 MILLI-UNITS/MIN: 10 INJECTION, SOLUTION INTRAMUSCULAR; INTRAVENOUS at 06:38

## 2022-09-02 RX ADMIN — LIDOCAINE HYDROCHLORIDE AND EPINEPHRINE 3 ML: 15; 5 INJECTION, SOLUTION EPIDURAL at 10:33

## 2022-09-02 RX ADMIN — LIDOCAINE HYDROCHLORIDE AND EPINEPHRINE 2 ML: 15; 5 INJECTION, SOLUTION EPIDURAL at 10:35

## 2022-09-02 RX ADMIN — SODIUM CHLORIDE, POTASSIUM CHLORIDE, SODIUM LACTATE AND CALCIUM CHLORIDE 125 ML/HR: 600; 310; 30; 20 INJECTION, SOLUTION INTRAVENOUS at 06:38

## 2022-09-02 RX ADMIN — IBUPROFEN 800 MG: 800 TABLET, FILM COATED ORAL at 18:18

## 2022-09-02 RX ADMIN — SODIUM CHLORIDE, POTASSIUM CHLORIDE, SODIUM LACTATE AND CALCIUM CHLORIDE 999 ML/HR: 600; 310; 30; 20 INJECTION, SOLUTION INTRAVENOUS at 08:52

## 2022-09-02 RX ADMIN — DOCUSATE SODIUM 100 MG: 100 CAPSULE, LIQUID FILLED ORAL at 18:18

## 2022-09-02 RX ADMIN — Medication 12 ML/HR: at 11:15

## 2022-09-02 RX ADMIN — BUPIVACAINE HYDROCHLORIDE 4 ML: 2.5 INJECTION, SOLUTION EPIDURAL; INFILTRATION; INTRACAUDAL; PERINEURAL at 10:39

## 2022-09-02 RX ADMIN — PSYLLIUM HUSK 1 PACKET: 3.4 POWDER ORAL at 18:18

## 2022-09-02 NOTE — ANESTHESIA PROCEDURE NOTES
Epidural Block    Patient location during procedure: OB  Start time: 9/2/2022 10:25 AM  End time: 9/2/2022 10:44 AM  Reason for block: labor epidural  Staffing  Performed: CRNA   Anesthesiologist: Eleonora Deng MD  Resident/CRNA: Christian Sánchez CRNA  Preanesthetic Checklist  Completed: patient identified, IV checked, site marked, risks and benefits discussed, surgical consent, monitors and equipment checked, pre-op evaluation, timeout performed and fire risk safety assessment completed and verbalized  Block Placement  Patient position: sitting  Prep: Betadine  Sterility prep: cap, drape, gloves, hand and mask  Sedation level: no sedation  Patient monitoring: heart rate and frequent blood pressure checks  Approach: midline  Location: lumbar  Lumbar location: L2-L3  Epidural  Loss of resistance technique: air  Guidance: landmark technique  Needle  Needle type: Tuohy   Needle gauge: 19 G  Needle length: 9 cm  Needle insertion depth: 4 cm  Catheter type: multi-orifice  Catheter size: 19 G  Catheter at skin depth: 11 cm  Catheter securement method: clear occlusive dressing, liquid medical adhesive and surgical tape  Test dose: negative  Assessment  Number of attempts: 1  Procedure assessment: patient tolerated procedure well with no immediate complications

## 2022-09-02 NOTE — PROGRESS NOTES
7422 - Pt arrived to unit from home with c/o \"water breaking\" at [facility]. Pt denies vaginal bleeding and endorses positive fetal movement. Pt oriented to room, given call bell, and instructed to change into pt gown.    0306 - Pt connected to EFM at this time. 0310 - Nitrazine positive    [id number] - RN notifying Dr. Vinod Iqbal of pt's arrival and status. TORB from Dr. Vinod Iqbal to order two units of RBCs to be prepared and placed on hold since pt at higher risk of bleeding. 0600 - Dr. Vinod Iqbal at pt bedside performing [address].     Creed Lee from Dr. Vinod Iqbal to start pitocin titration and collect UDS. Pitocin started at this time. UDS collected at this time. 0700 - Bedside and Verbal shift change report given to NANCY Gaviria RN (oncoming nurse) by KARSON Gray (offgoing nurse). Report included the following information SBAR, Kardex, Procedure Summary, Intake/Output, MAR, and Recent Results.

## 2022-09-02 NOTE — L&D DELIVERY NOTE
Delivery Summary    Patient: Chio Harrington MRN: [id number]  SSN: [id number]    YOB: 1990  Age: 33 y.o. Sex: female        over third degree perineal laceration. 5cm in length involving >50% of the anal sphincter. Sphincter reapproximated with 3-0 monocryl. Vaginal repaired with monocryl. Information for the patient's :  Navjot Villegas, Male Molina Gonzáles [[id number]]     Labor Events:    Labor: No    Steroids: Full Course   Cervical Ripening Date/Time:       Cervical Ripening Type: None   Antibiotics During Labor: No   Rupture Identifier: Sac 1    Rupture Date/Time: 2022 1:40 AM   Rupture Type: SROM   Amniotic Fluid Volume:  Moderate    Amniotic Fluid Description: Clear    Amniotic Fluid Odor: None    Induction: None       Induction Date/Time:        Indications for Induction:      Augmentation: Oxytocin   Augmentation Date/Time: 26:38 AM   Indications for Augmentation: Ineffective Contraction Pattern   Labor complications: None       Additional complications:        Delivery Events:  Indications For Episiotomy:     Episiotomy: None   Perineal Laceration(s): 3rd   Repaired: Yes   Periurethral Laceration Location:      Repaired:     Labial Laceration Location:     Repaired:     Sulcal Laceration Location:     Repaired:     Vaginal Laceration Location: bilateral   Repaired: Yes   Cervical Laceration Location:     Repaired:     Repair Suture: Monocryl 3-0   Number of Repair Packets: 2   Estimated Blood Loss (ml):  ml   Quantitative Blood Loss (ml)                Delivery Date: 2022    Delivery Time: 2:42 PM  Delivery Type: Vaginal, Spontaneous  Sex:  Male    Gestational Age: 37w1d   Delivery Clinician:  Kezia Stanton  Living Status: Living   Delivery Location: L&D            APGARS  One minute Five minutes Ten minutes   Skin color: 0   1        Heart rate: 2   2        Grimace: 2   2        Muscle tone: 2   2        Breathin   2        Totals: 8   9 Presentation: Vertex    Position:   Occiput Anterior  Resuscitation Method:  Suctioning-bulb; Tactile Stimulation     Meconium Stained: None      Cord Information: 3 Vessels  Complications: None  Cord around:    Delayed cord clamping? Yes  Cord clamped date/time:2022  2:47 PM  Disposition of Cord Blood: Discard    Blood Gases Sent?: No    Placenta:  Date/Time: 2022  2:45 PM  Removal: Expressed      Appearance: Normal     [place] Measurements:  Birth Weight:        Birth Length:        Head Circumference:        Chest Circumference:       Abdominal Girth: Other Providers:   JOHAN KENNEDY;LUIS VÁSQUEZ;RAQUEL AVALOS;BARON NGO, Obstetrician;Primary Nurse;Primary [place] Nurse;Charge Nurse         Group B Strep: No results found for: Anuradha Alanis  Information for the patient's :  Garret Daniel Male George Groves [[id number]]   No results found for: ABORH, PCTABR, PCTDIG, BILI, ABORHEXT, ABORH   No results for input(s): PCO2CB, PO2CB, HCO3I, SO2I, IBD, PTEMPI, SPECTI, PHICB, ISITE, IDEV, IALLEN in the last 72 hours.

## 2022-09-02 NOTE — ANESTHESIA PREPROCEDURE EVALUATION
Relevant Problems   HEMATOLOGY   (+) Anemia affecting pregnancy in third trimester       Anesthetic History   No history of anesthetic complications            Review of Systems / Medical History  Patient summary reviewed, nursing notes reviewed and pertinent labs reviewed    Pulmonary  Within defined limits                 Neuro/Psych   Within defined limits           Cardiovascular  Within defined limits                     GI/Hepatic/Renal  Within defined limits              Endo/Other  Within defined limits           Other Findings              Physical Exam    Airway  Mallampati: II  TM Distance: 4 - 6 cm  Neck ROM: normal range of motion   Mouth opening: Normal     Cardiovascular  Regular rate and rhythm,  S1 and S2 normal,  no murmur, click, rub, or gallop             Dental         Pulmonary                 Abdominal         Other Findings            Anesthetic Plan    ASA: 2  Anesthesia type: epidural      Post-op pain plan if not by surgeon: indwelling epidural catheter      Anesthetic plan and risks discussed with: Patient

## 2022-09-02 NOTE — PROGRESS NOTES
0700 - Verbal shift change report given to [facility], RN (oncoming nurse) by Parul Gutierres (offgoing nurse). Report included the following information SBAR, Kardex, Intake/Output, MAR, and Recent Results. 7685 - Patient uncomfortable while salma and requesting epidural. Fluid bolus started. Anesthesia notified. 5431 - Patient having recurrent late decelerations. Dr. Sirena Patten notified. 1010 - Dr. Sirena Patten at bedside for SVE. Patient 6cm. IUPC and FSE placed. 1020 - THANG Saleh in room to place epidural.    1025 - Time out. 1035 - Test dose. 200 - Dr. Sirena Patten at bedside for SVE. Patient 10/100/+2.     1245 - Patient actively pushing. RN remains in continuous attendance at the bedside. Assessment & evaluation of fetal heart rate ongoing via continuous EFM. Nydia 417 remained at bedside throughout pushing. EFM continuously assessed. Vaginal delivery of viable infant. Delivery QBL = 400 mL    2 hour QBL = 68 mL    Total QBL = 468 mL    1900 - Verbal shift change report given to Sergio Winn RN (oncoming nurse) by [facility], RN (offgoing nurse). Report included the following information SBAR, Kardex, Intake/Output, MAR, and Recent Results.

## 2022-09-02 NOTE — H&P
History & Physical    Name: Robinson Dudley MRN: [id number]  SSN: [id number]    YOB: 1990  Age: 33 y.o. Sex: female        Subjective:     Estimated Date of Delivery: 22  OB History    Para Term  AB Living   2       1     SAB IAB Ectopic Molar Multiple Live Births                    # Outcome Date GA Lbr Will/2nd Weight Sex Delivery Anes PTL Lv   2 Current            1 AB      TAB          Ms. Harley Mcgregor is a  with pregnancy at 37w1d that presents with leakage of clear vaginal fluid that started at 0140 and regular uterine contractions approximately every 5 minutes. Denies vaginal bleeding, headache, vision changes, nausea, vomiting, ruq pain, and epigastric pain. Her prenatal care is complicated by a large CEE fibroid measuring 6.7 x 8.7 x 5.7 cm that may be obstructing fetal descent, anemia, h/o threatened  labor for which she received course of betamethasone at 30 wks, h/o HSV with primary outbreak , and late transfer of care. Ultrasound performed in Dr. Marsh Lis office yesterday showed the fetal head 2 cm below the fibroid. She denies HSV prodromal symptoms. Please see prenatal records for details.     Past Medical History:   Diagnosis Date    Anemia     iron    Anemia affecting pregnancy in third trimester 2022    Fibroid, uterine      labor in second trimester with  delivery in third trimester     received betamethasome and tocolytics     GynHx:h/o HSV with primary outbreak 2022    Past Surgical History:   Procedure Laterality Date    HX DILATION AND CURETTAGE N/A      Social History     Occupational History    Not on file   Tobacco Use    Smoking status: Former     Packs/day: 0.50     Years: 12.00     Pack years: 6.00     Types: Cigarettes     Quit date:      Years since quittin.6    Smokeless tobacco: Never   Vaping Use    Vaping Use: Never used   Substance and Sexual Activity    Alcohol use: Not Currently    Drug use: Not Currently     Types: Marijuana    Sexual activity: Not on file     History reviewed. No pertinent family history. No Known Allergies  Prior to Admission medications    Medication Sig Start Date End Date Taking? Authorizing Provider   ferrous sulfate (Iron) 325 mg (65 mg iron) tablet Take 1 Tablet by mouth every other day. 8/29/22  Yes Greta Akhtar MD   PNV IV.32/CBUTVSS fum/folic ac (PRENATAL PO) Take  by mouth. Yes Provider, Historical   terconazole (TERAZOL 7) 0.4 % vaginal cream PLACE 1 APPLICATORFUL VAGINALLY NIGHTLY FOR 7 DAYS. Patient not taking: Reported on 9/2/2022 6/17/22   Provider, Historical   miscellaneous medical supply (Blood Pressure Cuff) misc 1 Each by Does Not Apply route daily. Patient not taking: Reported on 9/2/2022 8/17/22   Greta Akhtar MD        Review of Systems: Pertinent items are noted in HPI. Objective:     Vitals:  Vitals:    09/02/22 0411 09/02/22 0427 09/02/22 0442 09/02/22 0456   BP: 135/81 128/66 136/81 104/66   Pulse: 92 85 84 84   Resp:       Temp:       SpO2:       Weight:       Height:            Physical Exam:  Patient without distress. Heart: Regular rate and rhythm or S1S2 present  Lung: clear to auscultation throughout lung fields, no wheezes, no rales, no rhonchi, and normal respiratory effort  Abdomen: soft, nontender, gravid  Fundus: soft and non tender  Perineum: blood absent, amniotic fluid present, no HSV lesions  SSE: no vaginal HSV lesions, unable to adequately see cervix  Cervical Exam: 1/70/-3 vtx  Lower Extremities:  - Edema No  Membranes:  Premature Rupture of Membranes;  Amniotic Fluid: clear fluid  Fetal Heart Rate: Baseline: 140 per minute  Variability: moderate  Accelerations: yes  Decelerations: none  Uterine contractions: irregular, every 5-8 minutes  TAUS: fetal head is 4.4 cm below the uterine fibroid    Prenatal Labs:   Lab Results   Component Value Date/Time    ABO/Rh(D) A NEGATIVE 09/02/2022 04:10 AM    Rubella, External IMMUNE 2022 12:00 AM    HBsAg, External NEGATIVE 2022 12:00 AM    HIV, External NON-REACTIVE 2022 12:00 AM    RPR, External NON-REACTIVE 2022 12:00 AM    Gonorrhea, External NEGATIVE 2022 12:00 AM    Chlamydia, External NEGATIVE 2022 12:00 AM    ABO,Rh A NEGATIVE 2022 12:00 AM         Assessment/Plan:     Active Problems:    Velamentous insertion 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 Informed that  would be advised if there is evidence of labor dystocia and/or inadequate fetal descent. Also made aware that she is at significant risk of uterine atony, post partum hemorrhage and intraoperative hemorrhage. Type and cross for 2 units of PRBCs. NPO except for sips of clears. Counseled that pitocin augmentation would be  needed. Also made aware that if a  in warranted, because of her large CEE fibroid, she would need a vertical skin incision and possible classical uterine incision. Understands that if she has a classical uterine incision she would need repeat  for futire pregnancies. Patient agrees to proceed with a trial of labor.

## 2022-09-02 NOTE — DISCHARGE SUMMARY
Obstetrical Discharge Summary     Name: Bere Jang MRN: [id number]  SSN: [id number]    YOB: 1990  Age: 33 y.o. Sex: female      Admit Date: 2022    Discharge Date: 2022     Admitting Physician: Rosalie Berger MD     Attending Physician:  Marjan Kwon MD     Admission Diagnoses: Pregnancy [Z34.90]    Discharge Diagnoses:   Information for the patient's :  Reina Anne Male Lazara [[id number]]   Delivery of a   male infant via Vaginal, Spontaneous on 2022 at 2:42 PM  by Mat Perkins. Apgars were 8  and 9 . Additional Diagnoses: Third degree laceration  Hospital Problems  Date Reviewed: 2022            Codes Class Noted POA    Pregnancy ICD-10-CM: Z34.90  ICD-9-CM: V22.2  2022 Unknown        Anemia affecting pregnancy in third trimester ICD-10-CM: O99.013  ICD-9-CM: 648.23, 285.9  2022 Unknown        Full-term premature rupture of membranes ICD-10-CM: O42.92  ICD-9-CM: 658.10  2022 Unknown        Velamentous insertion of umbilical cord in third trimester ICD-10-CM: O43.123  ICD-9-CM: 663.83  2022 Yes        Uterine fibroid complicating  care, baby not yet delivered, third trimester ICD-10-CM: O34.13, D25.9  ICD-9-CM: 654.13, 218.9  2022 Unknown          Lab Results   Component Value Date/Time    Rubella, External IMMUNE 2022 12:00 AM       Hospital Course: Normal hospital course following the delivery. Disposition: Home  Condition: Good    Patient Instructions:   Current Discharge Medication List        START taking these medications    Details   ibuprofen (MOTRIN) 800 mg tablet Take 1 Tablet by mouth every eight (8) hours as needed for Pain. Qty: 60 Tablet, Refills: 0           CONTINUE these medications which have NOT CHANGED    Details   ferrous sulfate (Iron) 325 mg (65 mg iron) tablet Take 1 Tablet by mouth every other day.   Qty: 90 Tablet, Refills: 0    Associated Diagnoses: Anemia during pregnancy in third trimester PNV ZECHARIAH.16/EUIUWDD fum/folic ac (PRENATAL PO) Take  by mouth. terconazole (TERAZOL 7) 0.4 % vaginal cream PLACE 1 APPLICATORFUL VAGINALLY NIGHTLY FOR 7 DAYS. miscellaneous medical supply (Blood Pressure Cuff) misc 1 Each by Does Not Apply route daily. Qty: 1 Each, Refills: 0    Associated Diagnoses: Elevated BP without diagnosis of hypertension             Reference my discharge instructions.     Follow-up Appointments   Procedures    FOLLOW UP VISIT Appointment in: 6 Weeks     Standing Status:   Standing     Number of Occurrences:   1     Order Specific Question:   Appointment in     Answer:   6 Weeks        Signed By:  Jose Strickland MD     September 2, 2022

## 2022-09-03 PROCEDURE — 65270000029 HC RM PRIVATE

## 2022-09-03 PROCEDURE — 74011250636 HC RX REV CODE- 250/636: Performed by: OBSTETRICS & GYNECOLOGY

## 2022-09-03 PROCEDURE — 85461 HEMOGLOBIN FETAL: CPT

## 2022-09-03 PROCEDURE — 36415 COLL VENOUS BLD VENIPUNCTURE: CPT

## 2022-09-03 PROCEDURE — 86900 BLOOD TYPING SEROLOGIC ABO: CPT

## 2022-09-03 PROCEDURE — 74011250637 HC RX REV CODE- 250/637: Performed by: OBSTETRICS & GYNECOLOGY

## 2022-09-03 RX ADMIN — IBUPROFEN 800 MG: 800 TABLET, FILM COATED ORAL at 03:40

## 2022-09-03 RX ADMIN — PSYLLIUM HUSK 1 PACKET: 3.4 POWDER ORAL at 09:20

## 2022-09-03 RX ADMIN — HUMAN RHO(D) IMMUNE GLOBULIN 0.3 MG: 300 INJECTION, SOLUTION INTRAMUSCULAR at 15:50

## 2022-09-03 RX ADMIN — IBUPROFEN 800 MG: 800 TABLET, FILM COATED ORAL at 11:48

## 2022-09-03 RX ADMIN — IBUPROFEN 800 MG: 800 TABLET, FILM COATED ORAL at 20:28

## 2022-09-03 RX ADMIN — HYDROCODONE BITARTRATE AND ACETAMINOPHEN 1 TABLET: 5; 325 TABLET ORAL at 20:28

## 2022-09-03 NOTE — LACTATION NOTE
This note was copied from a baby's chart. Infant nursing well this morning after delivery. Around 900  this morning, infant remains sleepy. Therefore hand expression was taught and encouraged. Colostrum was finger fed to infant at this feeding. LC discussed waking infant every 2-3 hours or nursing baby on demand. Normal infant weight loss and expected infant weight loss explained. Breastfeeding booklet given, patient asked to call [facility] for further lactation assistance today. Hand Expression Education:  Mom taught how to manually hand express her colostrum. Emphasized the importance of providing infant with valuable colostrum as infant rests skin to skin at breast.  Aware to avoid extended periods of non-feeding. Aware to offer 10-20+ drops of colostrum every 2-3 hours until infant is latching and nursing effectively. Taught the rationale behind this low tech but highly effective evidence based practice. Reviewed breastfeeding basics:  How milk is made and normal  breastfeeding behaviors discussed. Supply and demand,  stomach size, early feeding cues, skin to skin bonding with comfortable positioning and baby led latch-on reviewed. How to identify signs of successful breastfeeding sessions reviewed; education on asymetrical latch, signs of effective latching vs shallow, in-effective latching, normal  feeding frequency and duration and expected infant output discussed. Normal course of breastfeeding discussed including the AAP's recommendation that children receive exclusive breast milk feedings for the first six months of life with breast milk feedings to continue through the first year of life and/or beyond as complimentary table foods are added. Breastfeeding Booklet and Warm line information provided with discussion.   Discussed typical  weight loss and the importance of pediatrician appointment within 24-48 hours of discharge, at 2 weeks of life and normalcy of requesting pediatric weight checks as needed in between visits. Pt will successfully establish breastfeeding by feeding in response to early feeding cues   or wake every 3h, will obtain deep latch, and will keep log of feedings/output. Taught to BF at hunger cues and or q 2-3 hrs and to offer 10-20 drops of hand expressed colostrum at any non-feeds. Breast Assessment  Left Breast: Medium, Large  Left Nipple: Everted, Short, Intact  Right Breast: Medium, Large  Right Nipple: Everted, Intact, Short  Breast- Feeding Assessment  Attends Breast-Feeding Classes: No  Breast-Feeding Experience: No  Breast Trauma/Surgery: No  Type/Quality: Good  Lactation Consultant Visits  Breast-Feedings: Fair (infant sleepy)  Mother/Infant Observation  Mother Observation: Breast comfortable, Holds breast, Close hold  Infant Observation: Other (comment) (infant sleepy)      Discussed with mother her plan for feeding. Reviewed the benefits of exclusive breast milk feeding during the hospital stay. Informed mother of the risks of using formula to supplement in the first few days of life as well as the benefits of successful breast milk feeding; referred mother to the handout in her admission packet related to these topics. Mother acknowledges understanding of information reviewed and states that it is her plan tobreast milk feed exclusively her infant. Will support her choice and offer additional information as needed.

## 2022-09-03 NOTE — PROGRESS NOTES
1900-Bedside and Verbal shift change report given to JATIN Bell RN (oncoming nurse) by Jess Art. Patti Grider RN (offgoing nurse). Report included the following information SBAR, Kardex, Intake/Output, MAR, and Recent Results. 2230-TRANSFER - OUT REPORT:    Verbal report given to KEO Tracy RN(name) on Amalia Freed  being transferred to MIU(unit) for routine progression of care       Report consisted of patients Situation, Background, Assessment and   Recommendations(SBAR). Information from the following report(s) SBAR, Kardex, Intake/Output, MAR, and Recent Results was reviewed with the receiving nurse. Lines:   Peripheral IV 09/02/22 Left;Posterior Hand (Active)   Site Assessment Clean, dry, & intact 09/02/22 1910   Phlebitis Assessment 0 09/02/22 1910   Infiltration Assessment 0 09/02/22 1910   Dressing Status Clean, dry, & intact 09/02/22 1910   Dressing Type Tape;Transparent 09/02/22 1910   Hub Color/Line Status Pink; Infusing 09/02/22 1910   Action Taken Open ports on tubing capped 09/02/22 1910   Alcohol Cap Used Yes 09/02/22 1910        Opportunity for questions and clarification was provided.       Patient transported with:   Registered Nurse

## 2022-09-04 VITALS
TEMPERATURE: 98.1 F | SYSTOLIC BLOOD PRESSURE: 122 MMHG | HEIGHT: 60 IN | BODY MASS INDEX: 31.16 KG/M2 | WEIGHT: 158.73 LBS | RESPIRATION RATE: 15 BRPM | DIASTOLIC BLOOD PRESSURE: 81 MMHG | OXYGEN SATURATION: 98 % | HEART RATE: 78 BPM

## 2022-09-04 LAB
ABO + RH BLD: NORMAL
BLD PROD TYP BPU: NORMAL
BPU ID: NORMAL
FETAL SCREEN,FMHS: NORMAL
STATUS OF UNIT,%ST: NORMAL
UNIT DIVISION, %UDIV: 0

## 2022-09-04 PROCEDURE — 74011250637 HC RX REV CODE- 250/637: Performed by: OBSTETRICS & GYNECOLOGY

## 2022-09-04 RX ADMIN — HYDROCODONE BITARTRATE AND ACETAMINOPHEN 1 TABLET: 5; 325 TABLET ORAL at 11:19

## 2022-09-04 RX ADMIN — IBUPROFEN 800 MG: 800 TABLET, FILM COATED ORAL at 03:52

## 2022-09-04 RX ADMIN — DOCUSATE SODIUM 100 MG: 100 CAPSULE, LIQUID FILLED ORAL at 11:19

## 2022-09-04 NOTE — DISCHARGE INSTRUCTIONS
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 Meals/snacks as desired which are high in fiber and carbohydrates and low in fat and cholesterol. Medications:   Current Discharge Medication List        START taking these medications    Details   ibuprofen (MOTRIN) 800 mg tablet Take 1 Tablet by mouth every eight (8) hours as needed for Pain. Qty: 60 Tablet, Refills: 0      AMBULATORY BREAST PUMP Use as directed  Qty: 1 Each, Refills: 0    Associated Diagnoses: 37 weeks gestation of pregnancy           CONTINUE these medications which have NOT CHANGED    Details   ferrous sulfate (Iron) 325 mg (65 mg iron) tablet Take 1 Tablet by mouth every other day. Qty: 90 Tablet, Refills: 0    Associated Diagnoses: Anemia during pregnancy in third trimester      PNV .16/PEASLIC fum/folic ac (PRENATAL PO) Take  by mouth. terconazole (TERAZOL 7) 0.4 % vaginal cream PLACE 1 APPLICATORFUL VAGINALLY NIGHTLY FOR 7 DAYS. miscellaneous medical supply (Blood Pressure Cuff) misc 1 Each by Does Not Apply route daily. Qty: 1 Each, Refills: 0    Associated Diagnoses: Elevated BP without diagnosis of hypertension             Physical Activity / Restrictions / Safety:     Avoid heavy lifting, no more that 8 lbs. For 2-3 weeks; No driving while taking narcotic pain medication. Post  patients should not drive until pain free. No intercourse 4-6 weeks, no douching or tampon use.  May resume exercise in 6 weeks. Discharge Instructions/Special Treatment/Home Care Needs:     Continue prenatal vitamins. Continue to use squirt bottle with warm water on your episiotomy after each bathroom use until bleeding stops. If steri-strips applied to your incision, remove in 7 days. Take stool softeners daily. Call your doctor for the following:     Fever over 101 degrees by mouth. Vaginal bleeding heavier than a normal menstrual period or lost larger than a golf ball. Red streaks or increased swelling of legs, painful red streaks on your breast.  Painful urination, or increased pain, redness or discharge with your incision. Pain Management:     Pain Management:   Take Acetaminophen (Tylenol) or Ibuprofen (Advil, Motrin), as directed for pain. Use a warm Sitz bath 3 times daily to relieve episiotomy or hemorrhoidal discomfort. Heating pad to  incision as needed. For hemorrhoidal discomfort, use Tucks and Anusol cream as needed and directed.     Follow-Up Care:     Appointment with MD:   Follow-up Appointments   Procedures    FOLLOW UP VISIT Appointment in: 6 Weeks     Standing Status:   Standing     Number of Occurrences:   1     Order Specific Question:   Appointment in     Answer:   Maxwell Gipson     Telephone number: [phone]    Signed By: Elier Morton MD                                                                                                   Date: 2022 Time: 10:10 AM

## 2022-09-04 NOTE — ROUTINE PROCESS
Patient discharged to home. Discharge instructions and education completed and patient reported she had no more questions. Bands verified on patient and infant, see footprint sheet. Infant placed in car seat by parent.

## 2022-09-04 NOTE — ROUTINE PROCESS
Bedside and verbal shift change report given to oncoming nurse, JATIN Saavedra, as assigned, by offgoing nurse, Yolanda Valles RN. Report included SBAR, Kardex, I&Os, Recent Results, Procedures, MAR, and changes in patient status. Oncoming nurse and patient given opportunity for questions.

## 2022-09-04 NOTE — PROGRESS NOTES
Post-Partum Day Number 2 Progress Note    Bere Jang     Information for the patient's :  Aroldo Berrios, Male [place] [[id number]]   Vaginal, Spontaneous  Patient doing well without significant complaint. Voiding without difficulty, normal lochia. Vitals:  Visit Vitals  /81 (BP 1 Location: Right upper arm, BP Patient Position: At rest)   Pulse 78   Temp 98.1 °F (36.7 °C)   Resp 15   Ht 5' (1.524 m)   Wt 158 lb 11.7 oz (72 kg)   LMP 2021   SpO2 98%   BMI 31.00 kg/m²     Temp (24hrs), Av °F (36.7 °C), Min:97.9 °F (36.6 °C), Max:98.1 °F (36.7 °C)      Exam:         Patient without distress. Abdomen soft, fundus firm, nontender                 ower extremities are negative for swelling, cords or tenderness. Labs:     Lab Results   Component Value Date/Time    WBC 14.3 (H) 2022 04:10 AM    WBC 15.5 (H) 2022 10:35 AM    WBC 17.0 (H) 2022 02:35 PM    HGB 9.9 (L) 2022 04:10 AM    HGB 10.0 (L) 2022 10:35 AM    HGB 10.1 (L) 2022 02:35 PM    HCT 30.4 (L) 2022 04:10 AM    HCT 31.4 (L) 2022 10:35 AM    HCT 31.8 (L) 2022 02:35 PM    PLATELET 084 64/10/0540 04:10 AM    PLATELET 842  10:35 AM    PLATELET 909  02:35 PM       No results found for this or any previous visit (from the past 24 hour(s)). Assessment: Doing well, post partum day 2    Plan:   1. Discharge home today  2. Follow up in office in 6 weeks with Dr. Neal Muhammad  3.  Post partum activity advised, diet as tolerated

## 2022-09-05 LAB
ABO + RH BLD: NORMAL
B-HEM STREP SPEC QL CULT: NEGATIVE
BLD PROD TYP BPU: NORMAL
BLOOD GROUP ANTIBODIES SERPL: NORMAL
BLOOD GROUP ANTIBODIES SERPL: NORMAL
BPU ID: NORMAL
CROSSMATCH RESULT,%XM: NORMAL
SPECIMEN EXP DATE BLD: NORMAL
STATUS OF UNIT,%ST: NORMAL
UNIT DIVISION, %UDIV: 0

## 2022-09-07 ENCOUNTER — TELEPHONE (OUTPATIENT)
Dept: FAMILY MEDICINE CLINIC | Age: 32
End: 2022-09-07

## 2022-09-07 PROBLEM — Z34.80 SUPERVISION OF OTHER NORMAL PREGNANCY, ANTEPARTUM: Status: ACTIVE | Noted: 2022-09-07

## 2022-09-14 ENCOUNTER — PATIENT MESSAGE (OUTPATIENT)
Dept: FAMILY MEDICINE CLINIC | Age: 32
End: 2022-09-14

## 2022-10-11 NOTE — PROGRESS NOTES
Postpartum evaluation    Yoan Croft is a 33 y.o. female who presents for a postpartum exam.   Had large CEE fibroid at term    She is now six weeks post normal spontaneous vaginal delivery 9/2/2022    Her baby is doing well. She has had no menses since delivery. She has had the following significant problems since her delivery: none    The patient is bottle feeding without difficulty. The patient would like to use nothing for birth control. She is currently taking: no medications. She is due for her next AE in 3 months.      Visit Vitals  /85   Wt 145 lb (65.8 kg)   LMP 10/11/2022   Breastfeeding No   BMI 28.32 kg/m²       PHYSICAL EXAMINATION    Constitutional  Appearance: well-nourished, well developed, alert, in no acute distress    HENT  Head and Face: appears normal    Neck  Inspection/Palpation: normal appearance, no masses or tenderness  Lymph Nodes: no lymphadenopathy present  Thyroid: gland size normal, nontender, no nodules or masses present on palpation    Breasts  Inspection of Breasts: breasts symmetrical, no skin changes, no discharge present, nipple appearance normal, no skin retraction present  Palpation of Breasts and Axillae: no masses present on palpation, no breast tenderness  Axillary Lymph Nodes: no lymphadenopathy present    Gastrointestinal  Abdominal Examination: abdomen non-tender to palpation, normal bowel sounds, no masses present  Liver and spleen: no hepatomegaly present, spleen not palpable  Hernias: no hernias identified    Genitourinary  External Genitalia: normal appearance for age, no discharge present, no tenderness present, no inflammatory lesions present, no masses present, no atrophy present  Vagina: normal vaginal vault without central or paravaginal defects, no discharge present, no inflammatory lesions present, no masses present  Bladder: non-tender to palpation  Urethra: appears normal  Cervix: normal   Uterus: fibroids 14 weeks, fibroid on the left  Adnexa: no adnexal tenderness present, no adnexal masses present  Perineum: perineum within normal limits, no evidence of trauma, no rashes or skin lesions present  Anus: anus within normal limits, no hemorrhoids present  Inguinal Lymph Nodes: no lymphadenopathy present    Skin  General Inspection: no rash, no lesions identified    Neurologic/Psychiatric  Mental Status:  Orientation: grossly oriented to person, place and time  Mood and Affect: mood normal, affect appropriate    Assessment:  Normal postpartum check  fibroids  Plan:  RTO for AE.   Repeat US with AE

## 2022-10-14 ENCOUNTER — OFFICE VISIT (OUTPATIENT)
Dept: OBGYN CLINIC | Age: 32
End: 2022-10-14
Payer: MEDICAID

## 2022-10-14 VITALS — DIASTOLIC BLOOD PRESSURE: 85 MMHG | WEIGHT: 145 LBS | BODY MASS INDEX: 28.32 KG/M2 | SYSTOLIC BLOOD PRESSURE: 125 MMHG

## 2022-10-14 DIAGNOSIS — D21.9 FIBROIDS: ICD-10-CM

## 2022-10-14 PROCEDURE — 0503F POSTPARTUM CARE VISIT: CPT | Performed by: OBSTETRICS & GYNECOLOGY

## 2022-11-08 ENCOUNTER — TELEPHONE (OUTPATIENT)
Dept: FAMILY MEDICINE CLINIC | Age: 32
End: 2022-11-08

## 2022-11-08 NOTE — TELEPHONE ENCOUNTER
----- Message from Cordelia Montoya sent at 11/8/2022 11:05 AM EST -----  Subject: Message to Provider    QUESTIONS  Information for Provider? Pt called in and want to leave message for pcp   vaginal issues and want to talk to pcp . Want to know if pcp can send   sonething over to pharmacy [facility] .  ---------------------------------------------------------------------------  --------------  [id number]  [phone]; OK to leave message on voicemail  ---------------------------------------------------------------------------  --------------  SCRIPT ANSWERS  Relationship to Patient?  Self

## 2022-11-10 NOTE — TELEPHONE ENCOUNTER
Called patient back, she would like to address concerns with OB/GYN, no questions for me at this time. Told to call back should anything else arise.      Jazmin Patino MD  [facility] Family Medicine Resident

## 2022-11-11 ENCOUNTER — TELEPHONE (OUTPATIENT)
Dept: OBGYN CLINIC | Age: 32
End: 2022-11-11

## 2022-11-11 NOTE — TELEPHONE ENCOUNTER
33year old patient last seen in the office on 10/14/2022 for pp visit    Patient reports having history of yeast infection and being prescribed Terazol    Patient calling to report having vaginal itching of her labia lips and denies odor or discharge    Pharmacy confirmed      ?  Ov and if so when     This nurse advised to use monistat 5-7 day course    Please advise    Thank you

## 2022-11-11 NOTE — TELEPHONE ENCOUNTER
Sasha Taylor MD  to [facility] [place] AT [place]    12:45 PM  Yes, over the counter Monistat 3 or 7 day is great     This nurse attempted to reach the patient and left a detailed message regarding MD recommendations .

## 2024-12-09 ENCOUNTER — NON-APPOINTMENT (OUTPATIENT)
Age: 34
End: 2024-12-09